# Patient Record
Sex: FEMALE | Race: ASIAN | NOT HISPANIC OR LATINO | URBAN - METROPOLITAN AREA
[De-identification: names, ages, dates, MRNs, and addresses within clinical notes are randomized per-mention and may not be internally consistent; named-entity substitution may affect disease eponyms.]

---

## 2017-05-30 ENCOUNTER — OUTPATIENT (OUTPATIENT)
Dept: OUTPATIENT SERVICES | Facility: HOSPITAL | Age: 32
LOS: 1 days | End: 2017-05-30

## 2017-05-30 DIAGNOSIS — O26.899 OTHER SPECIFIED PREGNANCY RELATED CONDITIONS, UNSPECIFIED TRIMESTER: ICD-10-CM

## 2017-05-30 DIAGNOSIS — Z3A.00 WEEKS OF GESTATION OF PREGNANCY NOT SPECIFIED: ICD-10-CM

## 2017-06-20 PROBLEM — Z00.00 ENCOUNTER FOR PREVENTIVE HEALTH EXAMINATION: Status: ACTIVE | Noted: 2017-06-20

## 2017-06-26 ENCOUNTER — APPOINTMENT (OUTPATIENT)
Dept: MATERNAL FETAL MEDICINE | Facility: CLINIC | Age: 32
End: 2017-06-26

## 2017-06-26 DIAGNOSIS — O24.919 UNSPECIFIED DIABETES MELLITUS IN PREGNANCY, UNSPECIFIED TRIMESTER: ICD-10-CM

## 2017-06-26 RX ORDER — LANCETS 33 GAUGE
EACH MISCELLANEOUS
Qty: 1 | Refills: 6 | Status: ACTIVE | COMMUNITY
Start: 2017-06-26 | End: 1900-01-01

## 2017-06-26 RX ORDER — URINE ACETONE TEST STRIPS
STRIP MISCELLANEOUS
Qty: 1 | Refills: 3 | Status: ACTIVE | COMMUNITY
Start: 2017-06-26 | End: 1900-01-01

## 2017-06-26 RX ORDER — BLOOD SUGAR DIAGNOSTIC
STRIP MISCELLANEOUS
Qty: 1 | Refills: 6 | Status: ACTIVE | COMMUNITY
Start: 2017-06-26 | End: 1900-01-01

## 2017-06-28 ENCOUNTER — APPOINTMENT (OUTPATIENT)
Dept: MATERNAL FETAL MEDICINE | Facility: CLINIC | Age: 32
End: 2017-06-28

## 2017-06-29 RX ORDER — 70%ISOPROPYL ALCOHOL 0.7 ML/ML
70 SWAB TOPICAL
Qty: 1 | Refills: 2 | Status: ACTIVE | COMMUNITY
Start: 2017-06-28 | End: 1900-01-01

## 2017-06-29 RX ORDER — INSULIN DETEMIR 100 [IU]/ML
100 INJECTION, SOLUTION SUBCUTANEOUS
Qty: 1 | Refills: 2 | Status: ACTIVE | COMMUNITY
Start: 2017-06-28 | End: 1900-01-01

## 2017-06-30 ENCOUNTER — MESSAGE (OUTPATIENT)
Age: 32
End: 2017-06-30

## 2017-07-07 ENCOUNTER — APPOINTMENT (OUTPATIENT)
Dept: MATERNAL FETAL MEDICINE | Facility: CLINIC | Age: 32
End: 2017-07-07

## 2017-07-07 DIAGNOSIS — O24.414 GESTATIONAL DIABETES MELLITUS IN PREGNANCY, INSULIN CONTROLLED: ICD-10-CM

## 2017-07-07 RX ORDER — INSULIN ASPART 100 [IU]/ML
100 INJECTION, SOLUTION INTRAVENOUS; SUBCUTANEOUS
Qty: 1 | Refills: 1 | Status: ACTIVE | COMMUNITY
Start: 2017-07-07 | End: 1900-01-01

## 2017-07-19 ENCOUNTER — MESSAGE (OUTPATIENT)
Age: 32
End: 2017-07-19

## 2017-07-21 ENCOUNTER — APPOINTMENT (OUTPATIENT)
Dept: MATERNAL FETAL MEDICINE | Facility: CLINIC | Age: 32
End: 2017-07-21

## 2017-08-04 ENCOUNTER — APPOINTMENT (OUTPATIENT)
Dept: MATERNAL FETAL MEDICINE | Facility: CLINIC | Age: 32
End: 2017-08-04
Payer: COMMERCIAL

## 2017-08-04 PROCEDURE — G0108 DIAB MANAGE TRN  PER INDIV: CPT

## 2017-08-09 ENCOUNTER — MESSAGE (OUTPATIENT)
Age: 32
End: 2017-08-09

## 2017-08-09 ENCOUNTER — APPOINTMENT (OUTPATIENT)
Dept: ANTEPARTUM | Facility: CLINIC | Age: 32
End: 2017-08-09

## 2017-08-09 ENCOUNTER — MEDICATION RENEWAL (OUTPATIENT)
Age: 32
End: 2017-08-09

## 2017-08-09 RX ORDER — PEN NEEDLE, DIABETIC 29 G X1/2"
32G X 4 MM NEEDLE, DISPOSABLE MISCELLANEOUS
Qty: 1 | Refills: 1 | Status: ACTIVE | COMMUNITY
Start: 2017-06-28 | End: 1900-01-01

## 2017-08-15 ENCOUNTER — APPOINTMENT (OUTPATIENT)
Dept: ANTEPARTUM | Facility: CLINIC | Age: 32
End: 2017-08-15
Payer: COMMERCIAL

## 2017-08-15 ENCOUNTER — OUTPATIENT (OUTPATIENT)
Dept: OUTPATIENT SERVICES | Facility: HOSPITAL | Age: 32
LOS: 1 days | End: 2017-08-15

## 2017-08-15 ENCOUNTER — ASOB RESULT (OUTPATIENT)
Age: 32
End: 2017-08-15

## 2017-08-15 PROCEDURE — 76818 FETAL BIOPHYS PROFILE W/NST: CPT | Mod: 26

## 2017-08-15 PROCEDURE — 76805 OB US >/= 14 WKS SNGL FETUS: CPT

## 2017-08-17 ENCOUNTER — APPOINTMENT (OUTPATIENT)
Dept: MATERNAL FETAL MEDICINE | Facility: CLINIC | Age: 32
End: 2017-08-17
Payer: COMMERCIAL

## 2017-08-17 PROCEDURE — G0108 DIAB MANAGE TRN  PER INDIV: CPT

## 2017-08-22 ENCOUNTER — APPOINTMENT (OUTPATIENT)
Dept: ANTEPARTUM | Facility: CLINIC | Age: 32
End: 2017-08-22
Payer: COMMERCIAL

## 2017-08-22 ENCOUNTER — ASOB RESULT (OUTPATIENT)
Age: 32
End: 2017-08-22

## 2017-08-22 ENCOUNTER — APPOINTMENT (OUTPATIENT)
Dept: ANTEPARTUM | Facility: HOSPITAL | Age: 32
End: 2017-08-22

## 2017-08-22 ENCOUNTER — OUTPATIENT (OUTPATIENT)
Dept: OUTPATIENT SERVICES | Facility: HOSPITAL | Age: 32
LOS: 1 days | End: 2017-08-22

## 2017-08-22 DIAGNOSIS — O99.283 ENDOCRINE, NUTRITIONAL AND METABOLIC DISEASES COMPLICATING PREGNANCY, THIRD TRIMESTER: ICD-10-CM

## 2017-08-22 DIAGNOSIS — Z3A.36 36 WEEKS GESTATION OF PREGNANCY: ICD-10-CM

## 2017-08-22 DIAGNOSIS — O99.013 ANEMIA COMPLICATING PREGNANCY, THIRD TRIMESTER: ICD-10-CM

## 2017-08-22 DIAGNOSIS — O24.414 GESTATIONAL DIABETES MELLITUS IN PREGNANCY, INSULIN CONTROLLED: ICD-10-CM

## 2017-08-22 PROCEDURE — 76818 FETAL BIOPHYS PROFILE W/NST: CPT | Mod: 26

## 2017-08-25 DIAGNOSIS — O24.414 GESTATIONAL DIABETES MELLITUS IN PREGNANCY, INSULIN CONTROLLED: ICD-10-CM

## 2017-08-25 DIAGNOSIS — O99.283 ENDOCRINE, NUTRITIONAL AND METABOLIC DISEASES COMPLICATING PREGNANCY, THIRD TRIMESTER: ICD-10-CM

## 2017-08-25 DIAGNOSIS — O99.013 ANEMIA COMPLICATING PREGNANCY, THIRD TRIMESTER: ICD-10-CM

## 2017-08-27 ENCOUNTER — INPATIENT (INPATIENT)
Facility: HOSPITAL | Age: 32
LOS: 2 days | Discharge: HOME | End: 2017-08-30
Attending: SPECIALIST | Admitting: SPECIALIST
Payer: COMMERCIAL

## 2017-08-27 VITALS — WEIGHT: 125.66 LBS | HEIGHT: 62 IN

## 2017-08-27 DIAGNOSIS — O42.10 PREMATURE RUPTURE OF MEMBRANES, ONSET OF LABOR MORE THAN 24 HOURS FOLLOWING RUPTURE, UNSPECIFIED WEEKS OF GESTATION: ICD-10-CM

## 2017-08-27 DIAGNOSIS — O26.899 OTHER SPECIFIED PREGNANCY RELATED CONDITIONS, UNSPECIFIED TRIMESTER: ICD-10-CM

## 2017-08-27 DIAGNOSIS — Z3A.00 WEEKS OF GESTATION OF PREGNANCY NOT SPECIFIED: ICD-10-CM

## 2017-08-27 LAB
BASOPHILS # BLD AUTO: 0.02 K/UL — SIGNIFICANT CHANGE UP (ref 0–0.2)
BASOPHILS NFR BLD AUTO: 0.1 % — SIGNIFICANT CHANGE UP (ref 0–2)
EOSINOPHIL # BLD AUTO: 0.04 K/UL — SIGNIFICANT CHANGE UP (ref 0–0.5)
EOSINOPHIL NFR BLD AUTO: 0.3 % — SIGNIFICANT CHANGE UP (ref 0–6)
HCT VFR BLD CALC: 34 % — LOW (ref 34.5–45)
HGB BLD-MCNC: 11.1 G/DL — LOW (ref 11.5–15.5)
IMM GRANULOCYTES # BLD AUTO: 0.09 # — SIGNIFICANT CHANGE UP
IMM GRANULOCYTES NFR BLD AUTO: 0.7 % — SIGNIFICANT CHANGE UP (ref 0–1.5)
LYMPHOCYTES # BLD AUTO: 16.7 % — SIGNIFICANT CHANGE UP (ref 13–44)
LYMPHOCYTES # BLD AUTO: 2.27 K/UL — SIGNIFICANT CHANGE UP (ref 1–3.3)
MCHC RBC-ENTMCNC: 24 PG — LOW (ref 27–34)
MCHC RBC-ENTMCNC: 32.6 % — SIGNIFICANT CHANGE UP (ref 32–36)
MCV RBC AUTO: 73.4 FL — LOW (ref 80–100)
MONOCYTES # BLD AUTO: 0.74 K/UL — SIGNIFICANT CHANGE UP (ref 0–0.9)
MONOCYTES NFR BLD AUTO: 5.4 % — SIGNIFICANT CHANGE UP (ref 2–14)
NEUTROPHILS # BLD AUTO: 10.47 K/UL — HIGH (ref 1.8–7.4)
NEUTROPHILS NFR BLD AUTO: 76.8 % — SIGNIFICANT CHANGE UP (ref 43–77)
NRBC # FLD: 0 — SIGNIFICANT CHANGE UP
PLATELET # BLD AUTO: 287 K/UL — SIGNIFICANT CHANGE UP (ref 150–400)
PMV BLD: 11.8 FL — SIGNIFICANT CHANGE UP (ref 7–13)
RBC # BLD: 4.63 M/UL — SIGNIFICANT CHANGE UP (ref 3.8–5.2)
RBC # FLD: 14.6 % — HIGH (ref 10.3–14.5)
RH IG SCN BLD-IMP: POSITIVE — SIGNIFICANT CHANGE UP
WBC # BLD: 13.63 K/UL — HIGH (ref 3.8–10.5)
WBC # FLD AUTO: 13.63 K/UL — HIGH (ref 3.8–10.5)

## 2017-08-27 RX ORDER — SODIUM CHLORIDE 9 MG/ML
1000 INJECTION, SOLUTION INTRAVENOUS ONCE
Qty: 0 | Refills: 0 | Status: COMPLETED | OUTPATIENT
Start: 2017-08-27 | End: 2017-08-28

## 2017-08-27 RX ORDER — SODIUM CHLORIDE 9 MG/ML
1000 INJECTION, SOLUTION INTRAVENOUS ONCE
Qty: 0 | Refills: 0 | Status: DISCONTINUED | OUTPATIENT
Start: 2017-08-27 | End: 2017-08-27

## 2017-08-27 RX ORDER — OXYTOCIN 10 UNIT/ML
333.33 VIAL (ML) INJECTION
Qty: 20 | Refills: 0 | Status: DISCONTINUED | OUTPATIENT
Start: 2017-08-27 | End: 2017-08-27

## 2017-08-27 RX ORDER — SODIUM CHLORIDE 9 MG/ML
1000 INJECTION, SOLUTION INTRAVENOUS
Qty: 0 | Refills: 0 | Status: DISCONTINUED | OUTPATIENT
Start: 2017-08-27 | End: 2017-08-27

## 2017-08-27 RX ORDER — CITRIC ACID/SODIUM CITRATE 300-500 MG
15 SOLUTION, ORAL ORAL EVERY 4 HOURS
Qty: 0 | Refills: 0 | Status: DISCONTINUED | OUTPATIENT
Start: 2017-08-27 | End: 2017-08-27

## 2017-08-27 RX ORDER — SODIUM CHLORIDE 9 MG/ML
1000 INJECTION, SOLUTION INTRAVENOUS
Qty: 0 | Refills: 0 | Status: DISCONTINUED | OUTPATIENT
Start: 2017-08-27 | End: 2017-08-28

## 2017-08-27 RX ADMIN — SODIUM CHLORIDE 125 MILLILITER(S): 9 INJECTION, SOLUTION INTRAVENOUS at 19:18

## 2017-08-27 RX ADMIN — SODIUM CHLORIDE 125 MILLILITER(S): 9 INJECTION, SOLUTION INTRAVENOUS at 16:00

## 2017-08-28 ENCOUNTER — TRANSCRIPTION ENCOUNTER (OUTPATIENT)
Age: 32
End: 2017-08-28

## 2017-08-28 DIAGNOSIS — O24.414 GESTATIONAL DIABETES MELLITUS IN PREGNANCY, INSULIN CONTROLLED: ICD-10-CM

## 2017-08-28 LAB
HBA1C BLD-MCNC: 6.1 % — HIGH (ref 4–5.6)
T PALLIDUM AB TITR SER: NEGATIVE — SIGNIFICANT CHANGE UP

## 2017-08-28 PROCEDURE — 99222 1ST HOSP IP/OBS MODERATE 55: CPT

## 2017-08-28 RX ORDER — KETOROLAC TROMETHAMINE 30 MG/ML
30 SYRINGE (ML) INJECTION ONCE
Qty: 0 | Refills: 0 | Status: DISCONTINUED | OUTPATIENT
Start: 2017-08-28 | End: 2017-08-28

## 2017-08-28 RX ORDER — IBUPROFEN 200 MG
600 TABLET ORAL EVERY 6 HOURS
Qty: 0 | Refills: 0 | Status: COMPLETED | OUTPATIENT
Start: 2017-08-28 | End: 2018-07-27

## 2017-08-28 RX ORDER — GLUCAGON INJECTION, SOLUTION 0.5 MG/.1ML
1 INJECTION, SOLUTION SUBCUTANEOUS ONCE
Qty: 0 | Refills: 0 | Status: DISCONTINUED | OUTPATIENT
Start: 2017-08-28 | End: 2017-08-30

## 2017-08-28 RX ORDER — ACETAMINOPHEN 500 MG
975 TABLET ORAL EVERY 6 HOURS
Qty: 0 | Refills: 0 | Status: DISCONTINUED | OUTPATIENT
Start: 2017-08-28 | End: 2017-08-30

## 2017-08-28 RX ORDER — AER TRAVELER 0.5 G/1
1 SOLUTION RECTAL; TOPICAL EVERY 4 HOURS
Qty: 0 | Refills: 0 | Status: DISCONTINUED | OUTPATIENT
Start: 2017-08-28 | End: 2017-08-28

## 2017-08-28 RX ORDER — DIPHENHYDRAMINE HCL 50 MG
25 CAPSULE ORAL EVERY 6 HOURS
Qty: 0 | Refills: 0 | Status: DISCONTINUED | OUTPATIENT
Start: 2017-08-28 | End: 2017-08-30

## 2017-08-28 RX ORDER — SODIUM CHLORIDE 9 MG/ML
3 INJECTION INTRAMUSCULAR; INTRAVENOUS; SUBCUTANEOUS EVERY 8 HOURS
Qty: 0 | Refills: 0 | Status: DISCONTINUED | OUTPATIENT
Start: 2017-08-28 | End: 2017-08-28

## 2017-08-28 RX ORDER — INSULIN LISPRO 100/ML
VIAL (ML) SUBCUTANEOUS
Qty: 0 | Refills: 0 | Status: DISCONTINUED | OUTPATIENT
Start: 2017-08-28 | End: 2017-08-30

## 2017-08-28 RX ORDER — OXYTOCIN 10 UNIT/ML
333.33 VIAL (ML) INJECTION
Qty: 20 | Refills: 0 | Status: COMPLETED | OUTPATIENT
Start: 2017-08-28

## 2017-08-28 RX ORDER — GLYCERIN ADULT
1 SUPPOSITORY, RECTAL RECTAL AT BEDTIME
Qty: 0 | Refills: 0 | Status: DISCONTINUED | OUTPATIENT
Start: 2017-08-28 | End: 2017-08-30

## 2017-08-28 RX ORDER — DEXTROSE 50 % IN WATER 50 %
25 SYRINGE (ML) INTRAVENOUS ONCE
Qty: 0 | Refills: 0 | Status: DISCONTINUED | OUTPATIENT
Start: 2017-08-28 | End: 2017-08-30

## 2017-08-28 RX ORDER — SODIUM CHLORIDE 9 MG/ML
3 INJECTION INTRAMUSCULAR; INTRAVENOUS; SUBCUTANEOUS EVERY 8 HOURS
Qty: 0 | Refills: 0 | Status: DISCONTINUED | OUTPATIENT
Start: 2017-08-28 | End: 2017-08-30

## 2017-08-28 RX ORDER — OXYTOCIN 10 UNIT/ML
2 VIAL (ML) INJECTION
Qty: 30 | Refills: 0 | Status: DISCONTINUED | OUTPATIENT
Start: 2017-08-28 | End: 2017-08-28

## 2017-08-28 RX ORDER — OXYCODONE HYDROCHLORIDE 5 MG/1
5 TABLET ORAL EVERY 4 HOURS
Qty: 0 | Refills: 0 | Status: DISCONTINUED | OUTPATIENT
Start: 2017-08-28 | End: 2017-08-30

## 2017-08-28 RX ORDER — SODIUM CHLORIDE 9 MG/ML
1000 INJECTION, SOLUTION INTRAVENOUS
Qty: 0 | Refills: 0 | Status: DISCONTINUED | OUTPATIENT
Start: 2017-08-28 | End: 2017-08-28

## 2017-08-28 RX ORDER — SODIUM CHLORIDE 9 MG/ML
1000 INJECTION, SOLUTION INTRAVENOUS
Qty: 0 | Refills: 0 | Status: DISCONTINUED | OUTPATIENT
Start: 2017-08-28 | End: 2017-08-30

## 2017-08-28 RX ORDER — DOCUSATE SODIUM 100 MG
100 CAPSULE ORAL
Qty: 0 | Refills: 0 | Status: DISCONTINUED | OUTPATIENT
Start: 2017-08-28 | End: 2017-08-30

## 2017-08-28 RX ORDER — INSULIN LISPRO 100/ML
VIAL (ML) SUBCUTANEOUS AT BEDTIME
Qty: 0 | Refills: 0 | Status: DISCONTINUED | OUTPATIENT
Start: 2017-08-28 | End: 2017-08-30

## 2017-08-28 RX ORDER — HYDROCORTISONE 1 %
1 OINTMENT (GRAM) TOPICAL EVERY 4 HOURS
Qty: 0 | Refills: 0 | Status: DISCONTINUED | OUTPATIENT
Start: 2017-08-28 | End: 2017-08-29

## 2017-08-28 RX ORDER — GENTAMICIN SULFATE 40 MG/ML
80 VIAL (ML) INJECTION ONCE
Qty: 0 | Refills: 0 | Status: COMPLETED | OUTPATIENT
Start: 2017-08-28 | End: 2017-08-28

## 2017-08-28 RX ORDER — OXYTOCIN 10 UNIT/ML
333.33 VIAL (ML) INJECTION
Qty: 20 | Refills: 0 | Status: COMPLETED | OUTPATIENT
Start: 2017-08-28 | End: 2017-08-28

## 2017-08-28 RX ORDER — MAGNESIUM HYDROXIDE 400 MG/1
30 TABLET, CHEWABLE ORAL
Qty: 0 | Refills: 0 | Status: DISCONTINUED | OUTPATIENT
Start: 2017-08-28 | End: 2017-08-30

## 2017-08-28 RX ORDER — ACETAMINOPHEN 500 MG
975 TABLET ORAL EVERY 6 HOURS
Qty: 0 | Refills: 0 | Status: COMPLETED | OUTPATIENT
Start: 2017-08-28 | End: 2018-07-27

## 2017-08-28 RX ORDER — LANOLIN
1 OINTMENT (GRAM) TOPICAL EVERY 6 HOURS
Qty: 0 | Refills: 0 | Status: DISCONTINUED | OUTPATIENT
Start: 2017-08-28 | End: 2017-08-30

## 2017-08-28 RX ORDER — DEXTROSE 50 % IN WATER 50 %
12.5 SYRINGE (ML) INTRAVENOUS ONCE
Qty: 0 | Refills: 0 | Status: DISCONTINUED | OUTPATIENT
Start: 2017-08-28 | End: 2017-08-30

## 2017-08-28 RX ORDER — DEXTROSE 50 % IN WATER 50 %
1 SYRINGE (ML) INTRAVENOUS ONCE
Qty: 0 | Refills: 0 | Status: DISCONTINUED | OUTPATIENT
Start: 2017-08-28 | End: 2017-08-30

## 2017-08-28 RX ORDER — SODIUM CHLORIDE 9 MG/ML
1000 INJECTION, SOLUTION INTRAVENOUS ONCE
Qty: 0 | Refills: 0 | Status: DISCONTINUED | OUTPATIENT
Start: 2017-08-28 | End: 2017-08-28

## 2017-08-28 RX ORDER — PRAMOXINE HYDROCHLORIDE 150 MG/15G
1 AEROSOL, FOAM RECTAL EVERY 4 HOURS
Qty: 0 | Refills: 0 | Status: DISCONTINUED | OUTPATIENT
Start: 2017-08-28 | End: 2017-08-28

## 2017-08-28 RX ORDER — OXYTOCIN 10 UNIT/ML
41.67 VIAL (ML) INJECTION
Qty: 20 | Refills: 0 | Status: DISCONTINUED | OUTPATIENT
Start: 2017-08-28 | End: 2017-08-28

## 2017-08-28 RX ORDER — AMPICILLIN TRIHYDRATE 250 MG
CAPSULE ORAL
Qty: 0 | Refills: 0 | Status: DISCONTINUED | OUTPATIENT
Start: 2017-08-28 | End: 2017-08-28

## 2017-08-28 RX ORDER — AER TRAVELER 0.5 G/1
1 SOLUTION RECTAL; TOPICAL EVERY 4 HOURS
Qty: 0 | Refills: 0 | Status: DISCONTINUED | OUTPATIENT
Start: 2017-08-28 | End: 2017-08-30

## 2017-08-28 RX ORDER — HYDROCORTISONE 1 %
1 OINTMENT (GRAM) TOPICAL EVERY 4 HOURS
Qty: 0 | Refills: 0 | Status: DISCONTINUED | OUTPATIENT
Start: 2017-08-28 | End: 2017-08-28

## 2017-08-28 RX ORDER — TETANUS TOXOID, REDUCED DIPHTHERIA TOXOID AND ACELLULAR PERTUSSIS VACCINE, ADSORBED 5; 2.5; 8; 8; 2.5 [IU]/.5ML; [IU]/.5ML; UG/.5ML; UG/.5ML; UG/.5ML
0.5 SUSPENSION INTRAMUSCULAR ONCE
Qty: 0 | Refills: 0 | Status: COMPLETED | OUTPATIENT
Start: 2017-08-28

## 2017-08-28 RX ORDER — CITRIC ACID/SODIUM CITRATE 300-500 MG
15 SOLUTION, ORAL ORAL EVERY 4 HOURS
Qty: 0 | Refills: 0 | Status: DISCONTINUED | OUTPATIENT
Start: 2017-08-28 | End: 2017-08-28

## 2017-08-28 RX ORDER — AMPICILLIN TRIHYDRATE 250 MG
2 CAPSULE ORAL ONCE
Qty: 0 | Refills: 0 | Status: COMPLETED | OUTPATIENT
Start: 2017-08-28 | End: 2017-08-28

## 2017-08-28 RX ORDER — SIMETHICONE 80 MG/1
80 TABLET, CHEWABLE ORAL EVERY 6 HOURS
Qty: 0 | Refills: 0 | Status: DISCONTINUED | OUTPATIENT
Start: 2017-08-28 | End: 2017-08-30

## 2017-08-28 RX ORDER — OXYCODONE HYDROCHLORIDE 5 MG/1
5 TABLET ORAL
Qty: 0 | Refills: 0 | Status: DISCONTINUED | OUTPATIENT
Start: 2017-08-28 | End: 2017-08-30

## 2017-08-28 RX ORDER — IBUPROFEN 200 MG
600 TABLET ORAL EVERY 6 HOURS
Qty: 0 | Refills: 0 | Status: DISCONTINUED | OUTPATIENT
Start: 2017-08-28 | End: 2017-08-30

## 2017-08-28 RX ORDER — PRAMOXINE HYDROCHLORIDE 150 MG/15G
1 AEROSOL, FOAM RECTAL EVERY 4 HOURS
Qty: 0 | Refills: 0 | Status: DISCONTINUED | OUTPATIENT
Start: 2017-08-28 | End: 2017-08-29

## 2017-08-28 RX ORDER — OXYTOCIN 10 UNIT/ML
41.67 VIAL (ML) INJECTION
Qty: 20 | Refills: 0 | Status: DISCONTINUED | OUTPATIENT
Start: 2017-08-28 | End: 2017-08-29

## 2017-08-28 RX ORDER — DIBUCAINE 1 %
1 OINTMENT (GRAM) RECTAL EVERY 4 HOURS
Qty: 0 | Refills: 0 | Status: DISCONTINUED | OUTPATIENT
Start: 2017-08-28 | End: 2017-08-30

## 2017-08-28 RX ORDER — AMPICILLIN TRIHYDRATE 250 MG
2 CAPSULE ORAL EVERY 6 HOURS
Qty: 0 | Refills: 0 | Status: DISCONTINUED | OUTPATIENT
Start: 2017-08-28 | End: 2017-08-28

## 2017-08-28 RX ORDER — DIBUCAINE 1 %
1 OINTMENT (GRAM) RECTAL EVERY 4 HOURS
Qty: 0 | Refills: 0 | Status: DISCONTINUED | OUTPATIENT
Start: 2017-08-28 | End: 2017-08-28

## 2017-08-28 RX ORDER — ACETAMINOPHEN 500 MG
975 TABLET ORAL ONCE
Qty: 0 | Refills: 0 | Status: COMPLETED | OUTPATIENT
Start: 2017-08-28 | End: 2017-08-28

## 2017-08-28 RX ADMIN — Medication 975 MILLIGRAM(S): at 10:02

## 2017-08-28 RX ADMIN — Medication 600 MILLIGRAM(S): at 22:00

## 2017-08-28 RX ADMIN — Medication 2 MILLIUNIT(S)/MIN: at 07:38

## 2017-08-28 RX ADMIN — Medication 975 MILLIGRAM(S): at 15:14

## 2017-08-28 RX ADMIN — Medication 125 MILLIUNIT(S)/MIN: at 12:50

## 2017-08-28 RX ADMIN — SODIUM CHLORIDE 3 MILLILITER(S): 9 INJECTION INTRAMUSCULAR; INTRAVENOUS; SUBCUTANEOUS at 15:45

## 2017-08-28 RX ADMIN — Medication 600 MILLIGRAM(S): at 22:30

## 2017-08-28 RX ADMIN — Medication: at 19:25

## 2017-08-28 RX ADMIN — Medication 600 MILLIGRAM(S): at 16:05

## 2017-08-28 RX ADMIN — Medication 600 MILLIGRAM(S): at 15:14

## 2017-08-28 RX ADMIN — Medication 100 MILLIGRAM(S): at 10:11

## 2017-08-28 RX ADMIN — Medication 975 MILLIGRAM(S): at 22:30

## 2017-08-28 RX ADMIN — Medication 975 MILLIGRAM(S): at 22:00

## 2017-08-28 RX ADMIN — SODIUM CHLORIDE 2000 MILLILITER(S): 9 INJECTION, SOLUTION INTRAVENOUS at 01:45

## 2017-08-28 RX ADMIN — Medication 975 MILLIGRAM(S): at 16:05

## 2017-08-28 RX ADMIN — Medication 216 GRAM(S): at 10:02

## 2017-08-28 RX ADMIN — Medication 1000 MILLIUNIT(S)/MIN: at 12:50

## 2017-08-28 NOTE — DISCHARGE NOTE OB - PATIENT PORTAL LINK FT
“You can access the [facility] Patient Portal, offered by [facility], by registering with the following website: [email or web address]”

## 2017-08-28 NOTE — DISCHARGE NOTE OB - MEDICATION SUMMARY - MEDICATIONS TO TAKE
I will START or STAY ON the medications listed below when I get home from the hospital:  None I will START or STAY ON the medications listed below when I get home from the hospital:    ibuprofen 600 mg oral tablet  -- 1 tab(s) by mouth every 6 hours, As Needed  -- Indication: For Pain    acetaminophen 325 mg oral tablet  -- 3 tab(s) by mouth every 6 hours, As Needed  -- Indication: For Pain    Prenatal Multivitamins with Folic Acid 1 mg oral tablet  -- 1 tab(s) by mouth once a day  -- Indication: For vitamins

## 2017-08-28 NOTE — DISCHARGE NOTE OB - CARE PROVIDER_API CALL
Cornelius Issa), Obstetrics and Gynecology  [address] [place] [address]  Phone: [phone]  Fax: [phone]

## 2017-08-28 NOTE — CONSULT NOTE ADULT - PROBLEM SELECTOR RECOMMENDATION 9
-patient educated regarding behavioral modifications, risk of developing T2DM in the future. She plans on breastfeeding  -monitor FS data with low correction sliding scale  -if FS remain in low 100's, discharge off all diabetes medications. If sugars are greater than 140's-metformin to titration  -repeat OGTT in 6 weeks post-partum  Plan discussed with team

## 2017-08-28 NOTE — CONSULT NOTE ADULT - SUBJECTIVE AND OBJECTIVE BOX
30 yo woman with GDM diagnosed via OGTT at 25 weeks post partum after delivering healthy baby boy (6 lbs 7 ounces) at 38.5 weeks.  The patient was on levemir 12 units and novolog 8-14 units TID with meals, on average injection about 10-12 with meals.  She has a working meter at home. Her sugars 1 hour post prandial during third trimester was < 140 and pre-prandial values were 80-90s.  Diet: liberal diet    FAMILY HISTORY:  Mother: T2DM on insulin  Father:  from accident    Social History:  Non-smoker, no alcohol, no recreational drugs      Outpatient Medications:  Levemir and Novolog (see above)    MEDICATIONS  (STANDING):  ibuprofen  Tablet 600 milliGRAM(s) Oral every 6 hours  acetaminophen   Tablet. 975 milliGRAM(s) Oral every 6 hours  oxyCODONE    IR 5 milliGRAM(s) Oral every 3 hours  insulin lispro (HumaLOG) corrective regimen sliding scale   SubCutaneous three times a day before meals  insulin lispro (HumaLOG) corrective regimen sliding scale   SubCutaneous at bedtime  dextrose 5%. 1000 milliLiter(s) (50 mL/Hr) IV Continuous <Continuous>  dextrose 50% Injectable 12.5 Gram(s) IV Push once  dextrose 50% Injectable 25 Gram(s) IV Push once  dextrose 50% Injectable 25 Gram(s) IV Push once    MEDICATIONS  (PRN):  oxyCODONE    IR 5 milliGRAM(s) Oral every 4 hours PRN Severe Pain (7 -10)  dextrose Gel 1 Dose(s) Oral once PRN Blood Glucose LESS THAN 70 milliGRAM(s)/deciLiter  glucagon  Injectable 1 milliGRAM(s) IntraMuscular once PRN Glucose <70 milliGRAM(s)/deciLiter      Allergies  No Known Allergies    Review of Systems:  Constitutional: No fever  Neuro: did not feel well with a low FS (not documented); drank juice, felt better  HEENT: No pain  Cardiovascular: No chest pain, palpitations  Respiratory: No SOB, no cough  GI: No nausea, vomiting, abdominal pain  : No dysuria  Skin: no rash  Endocrine: no polyuria, polydipsia  ALL OTHER SYSTEMS REVIEWED AND NEGATIVE      PHYSICAL EXAM:  VITALS: T(C): 36.8 (17 @ 14:08)  T(F): 98.3 (17 @ 14:08), Max: 99.1 (17 @ 11:52)  HR: 87 (17 @ 14:08) (87 - 118)  BP: 93/57 (17 @ 14:08) (91/62 - 107/59)  RR:  (18 - 18)  SpO2:  (99% - 99%)  Wt(kg): --  GENERAL: NAD, well-groomed, well-developed  EYES: No proptosis, no lid lag, anicteric  HEENT:  Atraumatic, Normocephalic, moist mucous membranes  THYROID: Normal size, no palpable nodules  RESPIRATORY: Clear to auscultation bilaterally; No rales, rhonchi, wheezing, or rubs  CARDIOVASCULAR: Regular rate and rhythm; No murmurs; no peripheral edema  SKIN: Dry, intact, No rashes or lesions  MUSCULOSKELETAL: Full range of motion, normal strength                          11.1   13.63 )-----------( 287      ( 27 Aug 2017 15:45 )             34.0

## 2017-08-29 ENCOUNTER — APPOINTMENT (OUTPATIENT)
Dept: ANTEPARTUM | Facility: CLINIC | Age: 32
End: 2017-08-29

## 2017-08-29 ENCOUNTER — APPOINTMENT (OUTPATIENT)
Dept: ANTEPARTUM | Facility: HOSPITAL | Age: 32
End: 2017-08-29

## 2017-08-29 LAB — RBC # BLD FETUS AUTO: 0.2 — SIGNIFICANT CHANGE UP

## 2017-08-29 PROCEDURE — 99232 SBSQ HOSP IP/OBS MODERATE 35: CPT

## 2017-08-29 RX ORDER — HYDROCORTISONE 1 %
1 OINTMENT (GRAM) TOPICAL EVERY 4 HOURS
Qty: 0 | Refills: 0 | Status: DISCONTINUED | OUTPATIENT
Start: 2017-08-29 | End: 2017-08-30

## 2017-08-29 RX ORDER — PRAMOXINE HYDROCHLORIDE 150 MG/15G
1 AEROSOL, FOAM RECTAL EVERY 4 HOURS
Qty: 0 | Refills: 0 | Status: DISCONTINUED | OUTPATIENT
Start: 2017-08-29 | End: 2017-08-30

## 2017-08-29 RX ORDER — TETANUS TOXOID, REDUCED DIPHTHERIA TOXOID AND ACELLULAR PERTUSSIS VACCINE, ADSORBED 5; 2.5; 8; 8; 2.5 [IU]/.5ML; [IU]/.5ML; UG/.5ML; UG/.5ML; UG/.5ML
0.5 SUSPENSION INTRAMUSCULAR ONCE
Qty: 0 | Refills: 0 | Status: COMPLETED | OUTPATIENT
Start: 2017-08-29 | End: 2017-08-29

## 2017-08-29 RX ADMIN — Medication 975 MILLIGRAM(S): at 07:00

## 2017-08-29 RX ADMIN — Medication 600 MILLIGRAM(S): at 13:00

## 2017-08-29 RX ADMIN — Medication 600 MILLIGRAM(S): at 06:30

## 2017-08-29 RX ADMIN — Medication 600 MILLIGRAM(S): at 13:40

## 2017-08-29 RX ADMIN — Medication 975 MILLIGRAM(S): at 06:30

## 2017-08-29 RX ADMIN — Medication 975 MILLIGRAM(S): at 13:40

## 2017-08-29 RX ADMIN — Medication 975 MILLIGRAM(S): at 13:00

## 2017-08-29 RX ADMIN — Medication 600 MILLIGRAM(S): at 07:00

## 2017-08-29 NOTE — PROGRESS NOTE ADULT - PROBLEM SELECTOR PLAN 1
-patient's fingersticks are within target levels. If sugars remain less than 130-140's she can be discharged home without anti-diabetes medications. If > 140 consistently, metformin 500 mg BID  -patient to get repeat OGTT in 6 weeks, outpatient setting  These plans were discussed with OB resident. Endocrine will sign off, recall with quesitons

## 2017-08-29 NOTE — PROGRESS NOTE ADULT - SUBJECTIVE AND OBJECTIVE BOX
Follow up for gestational diabetes. Patient is eating, feeling well. No acute/interval symptoms    MEDICATIONS  (STANDING):  oxyCODONE    IR 5 milliGRAM(s) Oral every 3 hours  sodium chloride 0.9% lock flush 3 milliLiter(s) IV Push every 8 hours  diphtheria/tetanus/pertussis (acellular) Vaccine (ADAcel) 0.5 milliLiter(s) IntraMuscular once  prenatal multivitamin 1 Tablet(s) Oral daily  insulin lispro (HumaLOG) corrective regimen sliding scale   SubCutaneous three times a day before meals  insulin lispro (HumaLOG) corrective regimen sliding scale   SubCutaneous at bedtime  dextrose 5%. 1000 milliLiter(s) (50 mL/Hr) IV Continuous <Continuous>  dextrose 50% Injectable 12.5 Gram(s) IV Push once  dextrose 50% Injectable 25 Gram(s) IV Push once  dextrose 50% Injectable 25 Gram(s) IV Push once  acetaminophen   Tablet. 975 milliGRAM(s) Oral every 6 hours  ibuprofen  Tablet 600 milliGRAM(s) Oral every 6 hours    MEDICATIONS  (PRN):  oxyCODONE    IR 5 milliGRAM(s) Oral every 4 hours PRN Severe Pain (7 -10)  dibucaine 1% Ointment 1 Application(s) Topical every 4 hours PRN Perineal Discomfort  lanolin Ointment 1 Application(s) Topical every 6 hours PRN Sore Nipples  witch hazel Pads 1 Application(s) Topical every 4 hours PRN Perineal Discomfort  simethicone 80 milliGRAM(s) Chew every 6 hours PRN Gas  diphenhydrAMINE   Capsule 25 milliGRAM(s) Oral every 6 hours PRN Itching  glycerin Suppository - Adult 1 Suppository(s) Rectal at bedtime PRN Constipation  magnesium hydroxide Suspension 30 milliLiter(s) Oral two times a day PRN Constipation  docusate sodium 100 milliGRAM(s) Oral two times a day PRN Stool Softening  dextrose Gel 1 Dose(s) Oral once PRN Blood Glucose LESS THAN 70 milliGRAM(s)/deciLiter  glucagon  Injectable 1 milliGRAM(s) IntraMuscular once PRN Glucose <70 milliGRAM(s)/deciLiter  pramoxine 1%/zinc 5% Cream 1 Application(s) Topical every 4 hours PRN perineal discomfort  hydrocortisone 1% Cream 1 Application(s) Topical every 4 hours PRN perineal discomfort      Allergies  No Known Allergies      Review of Systems:  Constitutional: No fever  Eyes: No blurry vision  Neuro: No tremors  HEENT: No pain  Cardiovascular: No chest pain, palpitations  Respiratory: No SOB, no cough  GI: No nausea, vomiting, abdominal pain  ALL OTHER SYSTEMS REVIEWED AND NEGATIVE      PHYSICAL EXAM:  VITALS: T(C): 36.8 (08-29-17 @ 06:56)  T(F): 98.3 (08-29-17 @ 06:56), Max: 98.3 (08-28-17 @ 14:08)  HR: 89 (08-29-17 @ 06:56) (87 - 95)  BP: 100/61 (08-29-17 @ 06:56) (93/57 - 107/64)  RR:  (18 - 19)  SpO2:  (99% - 100%)  Wt(kg): --  GENERAL: NAD, well-groomed, well-developed  EYES: No proptosis, no lid lag, anicteric  HEENT:  Atraumatic, Normocephalic, moist mucous membranes  RESPIRATORY: Unlabored, no use of accessory m uscles  CARDIOVASCULAR: Regular rate and rhythm; No murmurs; no peripheral edema  SKIN: Dry, intact, No rashes or lesions  MUSCULOSKELETAL: Full range of motion, normal strength      CAPILLARY BLOOD GLUCOSE  109 (08-29 @ 13:00)  98 (08-29 @ 06:56)  141 (08-28 @ 22:30)  166 (08-28 @ 18:40)    Hemoglobin A1C, Whole Blood: 6.1 % <H> [4.0 - 5.6] (08-28-17 @ 16:13) Follow up for gestational diabetes. Patient is eating, feeling well. No acute/interval symptoms    MEDICATIONS  (STANDING):  oxyCODONE    IR 5 milliGRAM(s) Oral every 3 hours  sodium chloride 0.9% lock flush 3 milliLiter(s) IV Push every 8 hours  diphtheria/tetanus/pertussis (acellular) Vaccine (ADAcel) 0.5 milliLiter(s) IntraMuscular once  prenatal multivitamin 1 Tablet(s) Oral daily  insulin lispro (HumaLOG) corrective regimen sliding scale   SubCutaneous three times a day before meals  insulin lispro (HumaLOG) corrective regimen sliding scale   SubCutaneous at bedtime  dextrose 5%. 1000 milliLiter(s) (50 mL/Hr) IV Continuous <Continuous>  dextrose 50% Injectable 12.5 Gram(s) IV Push once  dextrose 50% Injectable 25 Gram(s) IV Push once  dextrose 50% Injectable 25 Gram(s) IV Push once  acetaminophen   Tablet. 975 milliGRAM(s) Oral every 6 hours  ibuprofen  Tablet 600 milliGRAM(s) Oral every 6 hours    MEDICATIONS  (PRN):  oxyCODONE    IR 5 milliGRAM(s) Oral every 4 hours PRN Severe Pain (7 -10)  dibucaine 1% Ointment 1 Application(s) Topical every 4 hours PRN Perineal Discomfort  lanolin Ointment 1 Application(s) Topical every 6 hours PRN Sore Nipples  witch hazel Pads 1 Application(s) Topical every 4 hours PRN Perineal Discomfort  simethicone 80 milliGRAM(s) Chew every 6 hours PRN Gas  diphenhydrAMINE   Capsule 25 milliGRAM(s) Oral every 6 hours PRN Itching  glycerin Suppository - Adult 1 Suppository(s) Rectal at bedtime PRN Constipation  magnesium hydroxide Suspension 30 milliLiter(s) Oral two times a day PRN Constipation  docusate sodium 100 milliGRAM(s) Oral two times a day PRN Stool Softening  dextrose Gel 1 Dose(s) Oral once PRN Blood Glucose LESS THAN 70 milliGRAM(s)/deciLiter  glucagon  Injectable 1 milliGRAM(s) IntraMuscular once PRN Glucose <70 milliGRAM(s)/deciLiter  pramoxine 1%/zinc 5% Cream 1 Application(s) Topical every 4 hours PRN perineal discomfort  hydrocortisone 1% Cream 1 Application(s) Topical every 4 hours PRN perineal discomfort      Allergies  No Known Allergies    Review of Systems:  Constitutional: No fever  Eyes: No blurry vision  Neuro: No tremors  HEENT: No pain  Cardiovascular: No chest pain, palpitations  Respiratory: No SOB, no cough  GI: No nausea, vomiting, abdominal pain  ALL OTHER SYSTEMS REVIEWED AND NEGATIVE      PHYSICAL EXAM:  VITALS: T(C): 36.8 (08-29-17 @ 06:56)  T(F): 98.3 (08-29-17 @ 06:56), Max: 98.3 (08-28-17 @ 14:08)  HR: 89 (08-29-17 @ 06:56) (87 - 95)  BP: 100/61 (08-29-17 @ 06:56) (93/57 - 107/64)  RR:  (18 - 19)  SpO2:  (99% - 100%)  Wt(kg): --  GENERAL: NAD, well-groomed, well-developed  EYES: No proptosis, no lid lag, anicteric  HEENT:  Atraumatic, Normocephalic, moist mucous membranes  RESPIRATORY: Unlabored, no use of accessory m uscles  CARDIOVASCULAR: Regular rate and rhythm; No murmurs; no peripheral edema  SKIN: Dry, intact, No rashes or lesions  MUSCULOSKELETAL: Full range of motion, normal strength      CAPILLARY BLOOD GLUCOSE  109 (08-29 @ 13:00)  98 (08-29 @ 06:56)  141 (08-28 @ 22:30)  166 (08-28 @ 18:40)    Hemoglobin A1C, Whole Blood: 6.1 % <H> [4.0 - 5.6] (08-28-17 @ 16:13)

## 2017-08-29 NOTE — PROGRESS NOTE ADULT - SUBJECTIVE AND OBJECTIVE BOX
S: Patient doing well. Minimal lochia. Pain controlled. breastfeeding    O: Vital Signs Last 24 Hrs  T(C): 36.8 (29 Aug 2017 06:56), Max: 36.8 (28 Aug 2017 14:08)  T(F): 98.3 (29 Aug 2017 06:56), Max: 98.3 (28 Aug 2017 14:08)  HR: 89 (29 Aug 2017 06:56) (87 - 95)  BP: 100/61 (29 Aug 2017 06:56) (93/57 - 107/64)  BP(mean): --  RR: 18 (29 Aug 2017 06:56) (18 - 19)  SpO2: 100% (29 Aug 2017 06:56) (99% - 100%)    Gen: NAD  Abd: soft, NT, ND, fundus firm below umbilicus  Lochia: moderate  Ext: no tenderness    Labs:                        11.1   13.63 )-----------( 287      ( 27 Aug 2017 15:45 )             34.0       ABO Interpretation: O ( @ 15:23)    Rh Interpretation: Positive ( @ 15:23)        A: 31y PPD# 1s/p  doing well.     Plan: Continue routine postpartum care.

## 2017-08-30 VITALS
SYSTOLIC BLOOD PRESSURE: 95 MMHG | TEMPERATURE: 98 F | DIASTOLIC BLOOD PRESSURE: 60 MMHG | OXYGEN SATURATION: 100 % | RESPIRATION RATE: 17 BRPM | HEART RATE: 84 BPM

## 2017-08-30 RX ORDER — ACETAMINOPHEN 500 MG
3 TABLET ORAL
Qty: 0 | Refills: 0 | DISCHARGE
Start: 2017-08-30

## 2017-08-30 RX ORDER — IBUPROFEN 200 MG
1 TABLET ORAL
Qty: 0 | Refills: 0 | DISCHARGE
Start: 2017-08-30

## 2017-08-30 RX ADMIN — Medication 975 MILLIGRAM(S): at 01:14

## 2017-08-30 RX ADMIN — Medication 600 MILLIGRAM(S): at 09:22

## 2017-08-30 RX ADMIN — Medication 100 MILLIGRAM(S): at 08:24

## 2017-08-30 RX ADMIN — Medication 600 MILLIGRAM(S): at 01:14

## 2017-08-30 RX ADMIN — TETANUS TOXOID, REDUCED DIPHTHERIA TOXOID AND ACELLULAR PERTUSSIS VACCINE, ADSORBED 0.5 MILLILITER(S): 5; 2.5; 8; 8; 2.5 SUSPENSION INTRAMUSCULAR at 00:25

## 2017-08-30 RX ADMIN — Medication 600 MILLIGRAM(S): at 08:22

## 2017-08-30 RX ADMIN — Medication 600 MILLIGRAM(S): at 00:25

## 2017-08-30 RX ADMIN — Medication 975 MILLIGRAM(S): at 09:22

## 2017-08-30 RX ADMIN — Medication 975 MILLIGRAM(S): at 08:22

## 2017-08-30 RX ADMIN — Medication 975 MILLIGRAM(S): at 00:25

## 2017-08-30 NOTE — PROGRESS NOTE ADULT - SUBJECTIVE AND OBJECTIVE BOX
Patient assessed at 1015.  Subjective  Pain: Patient reports soreness of perineum that is relieved by pain management protocol, topicals and witch hazel pads.  Complaints: Reports soreness of perineum  Milestones: Alert and orientedx3. Out of bed ambulating. Voiding/Due to void. Tolerating a regular diet.  Infant feeding: Breastfeeding. Infant in the NICU and patient states infant latches onto breasts                             Feeding related issues or concerns: None.     Objective   Vital Signs:  Vital Signs Last 24 Hrs  T(C): 36.7 (30 Aug 2017 06:59), Max: 36.7 (30 Aug 2017 06:59)  T(F): 98.1 (30 Aug 2017 06:59), Max: 98.1 (30 Aug 2017 06:59)  HR: 84 (30 Aug 2017 06:59) (84 - 89)  BP: 95/60 (30 Aug 2017 06:59) (95/60 - 102/66)  BP(mean): --  RR: 17 (30 Aug 2017 06:59) (17 - 18)  SpO2: 100% (30 Aug 2017 06:59) (100% - 100%)    Fingersticks monitoring due to gestational diabetes insulin controlled.   CAPILLARY BLOOD GLUCOSE  92 (30 Aug 2017 06:59)  114 (30 Aug 2017 00:30)  108 (29 Aug 2017 20:44)  109 (29 Aug 2017 13:00)    Labs:  17 @ 1545 WBC 13.63, RBC 4.63, Hgb 11.1, Hct 34, Plt 287  Blood Type: Opositive   Rubella: Immune  RPR: nonreactive    Assessment  32y/o . Day #2 postpartum .  Past medical history significant for gestational diabetes insulin controlled. Fingersticks before meal and at bedtime WNL postpartum. Patient seen endocrinology since delivery. As per recommendation based on postpartum fingersticks patient to be discharge to home and follow of glucose testing in 6weeks. Patient informed and verbalized an understanding.    Current Issues: s/p intrapartum temperature of 38.5 (17 @1000). Patient afebrile since then.  Breasts: mares, nontender  Nipples: intact  Abdomen: Soft, nontender, nondistended. Fundus firm. Positive bowel sounds  Vagina: Lochia light rubra  Perineum:  median episiotomy WNL. No edema noted to perineum  Laceration/episiotomy site: median episiotomy  Lower extremities: No edema noted bilaterally of lower extremities. Nontender calves.  Negative Gillian's sign. Positive pedal pulses.  Other relevant physical exam findings: None      Plan  Plan: Increase ambulation, analgesia PRN and pain medication protocol standing oxycodone, ibuprofen and acetaminophen.  Diet: Continue regular diet  Labs: None    Continue routine postpartum care. Patient to be discharge to home today. Discussed discharge planning. Patient assessed at 1015.  Subjective  Pain: Patient reports soreness of perineum that is relieved by pain management protocol, topicals and witch hazel pads.  Complaints: Reports soreness of perineum  Milestones: Alert and orientedx3. Out of bed ambulating. Positive bowel movement. Voiding. Tolerating a regular diet.  Infant feeding: Breastfeeding. Infant in the NICU and patient states infant latches onto breasts                             Feeding related issues or concerns: None.     Objective   Vital Signs:  Vital Signs Last 24 Hrs  T(C): 36.7 (30 Aug 2017 06:59), Max: 36.7 (30 Aug 2017 06:59)  T(F): 98.1 (30 Aug 2017 06:59), Max: 98.1 (30 Aug 2017 06:59)  HR: 84 (30 Aug 2017 06:59) (84 - 89)  BP: 95/60 (30 Aug 2017 06:59) (95/60 - 102/66)  BP(mean): --  RR: 17 (30 Aug 2017 06:59) (17 - 18)  SpO2: 100% (30 Aug 2017 06:59) (100% - 100%)    Fingersticks monitoring due to gestational diabetes insulin controlled.   CAPILLARY BLOOD GLUCOSE  92 (30 Aug 2017 06:59)  114 (30 Aug 2017 00:30)  108 (29 Aug 2017 20:44)  109 (29 Aug 2017 13:00)    Labs:  17 @ 1545 WBC 13.63, RBC 4.63, Hgb 11.1, Hct 34, Plt 287  Blood Type: Opositive   Rubella: Immune  RPR: nonreactive    Assessment  30y/o . Day #2 postpartum .  Past medical history significant for gestational diabetes insulin controlled. Fingersticks before meal and at bedtime WNL postpartum. Patient seen endocrinology since delivery. As per recommendation based on postpartum fingersticks patient to be discharge to home and follow of glucose testing in 6weeks. Patient informed and verbalized an understanding.    Current Issues: s/p intrapartum temperature of 38.5 (17 @1000). Patient afebrile since then.  Breasts: mares, nontender  Nipples: intact  Abdomen: Soft, nontender, nondistended. Fundus firm. Positive bowel sounds  Vagina: Lochia light rubra  Perineum:  median episiotomy WNL. No edema noted to perineum  Laceration/episiotomy site: median episiotomy  Lower extremities: No edema noted bilaterally of lower extremities. Nontender calves.  Negative Gillian's sign. Positive pedal pulses.  Other relevant physical exam findings: None      Plan  Plan: Increase ambulation, analgesia PRN and pain medication protocol standing oxycodone, ibuprofen and acetaminophen.  Diet: Continue regular diet  Labs: None    Continue routine postpartum care. Patient to be discharge to home today. Discussed discharge planning.

## 2017-10-20 ENCOUNTER — OUTPATIENT (OUTPATIENT)
Dept: OUTPATIENT SERVICES | Facility: HOSPITAL | Age: 32
LOS: 1 days | Discharge: HOME | End: 2017-10-20

## 2017-10-20 LAB
ALBUMIN SERPL ELPH-MCNC: 4.9 G/DL — SIGNIFICANT CHANGE UP (ref 3.3–5)
ALP SERPL-CCNC: 66 U/L — SIGNIFICANT CHANGE UP (ref 40–120)
ALT FLD-CCNC: 14 U/L — SIGNIFICANT CHANGE UP (ref 4–33)
AST SERPL-CCNC: 21 U/L — SIGNIFICANT CHANGE UP (ref 4–32)
BASOPHILS # BLD AUTO: 0.03 K/UL — SIGNIFICANT CHANGE UP (ref 0–0.2)
BASOPHILS NFR BLD AUTO: 0.4 % — SIGNIFICANT CHANGE UP (ref 0–2)
BILIRUB SERPL-MCNC: 0.3 MG/DL — SIGNIFICANT CHANGE UP (ref 0.2–1.2)
BUN SERPL-MCNC: 13 MG/DL — SIGNIFICANT CHANGE UP (ref 7–23)
CALCIUM SERPL-MCNC: 9.1 MG/DL — SIGNIFICANT CHANGE UP (ref 8.4–10.5)
CHLORIDE SERPL-SCNC: 99 MMOL/L — SIGNIFICANT CHANGE UP (ref 98–107)
CO2 SERPL-SCNC: 27 MMOL/L — SIGNIFICANT CHANGE UP (ref 22–31)
CREAT SERPL-MCNC: 0.55 MG/DL — SIGNIFICANT CHANGE UP (ref 0.5–1.3)
EOSINOPHIL # BLD AUTO: 0.12 K/UL — SIGNIFICANT CHANGE UP (ref 0–0.5)
EOSINOPHIL NFR BLD AUTO: 1.5 % — SIGNIFICANT CHANGE UP (ref 0–6)
GLUCOSE SERPL-MCNC: 65 MG/DL — LOW (ref 70–99)
HCT VFR BLD CALC: 32.6 % — LOW (ref 34.5–45)
HGB BLD-MCNC: 10 G/DL — LOW (ref 11.5–15.5)
IMM GRANULOCYTES # BLD AUTO: 0.02 # — SIGNIFICANT CHANGE UP
IMM GRANULOCYTES NFR BLD AUTO: 0.3 % — SIGNIFICANT CHANGE UP (ref 0–1.5)
LYMPHOCYTES # BLD AUTO: 2.45 K/UL — SIGNIFICANT CHANGE UP (ref 1–3.3)
LYMPHOCYTES # BLD AUTO: 30.9 % — SIGNIFICANT CHANGE UP (ref 13–44)
MCHC RBC-ENTMCNC: 22.7 PG — LOW (ref 27–34)
MCHC RBC-ENTMCNC: 30.7 % — LOW (ref 32–36)
MCV RBC AUTO: 73.9 FL — LOW (ref 80–100)
MONOCYTES # BLD AUTO: 0.52 K/UL — SIGNIFICANT CHANGE UP (ref 0–0.9)
MONOCYTES NFR BLD AUTO: 6.6 % — SIGNIFICANT CHANGE UP (ref 2–14)
NEUTROPHILS # BLD AUTO: 4.79 K/UL — SIGNIFICANT CHANGE UP (ref 1.8–7.4)
NEUTROPHILS NFR BLD AUTO: 60.3 % — SIGNIFICANT CHANGE UP (ref 43–77)
NRBC # FLD: 0 — SIGNIFICANT CHANGE UP
PLATELET # BLD AUTO: 390 K/UL — SIGNIFICANT CHANGE UP (ref 150–400)
PMV BLD: 11.1 FL — SIGNIFICANT CHANGE UP (ref 7–13)
POTASSIUM SERPL-MCNC: 3.9 MMOL/L — SIGNIFICANT CHANGE UP (ref 3.5–5.3)
POTASSIUM SERPL-SCNC: 3.9 MMOL/L — SIGNIFICANT CHANGE UP (ref 3.5–5.3)
PROT SERPL-MCNC: 7.7 G/DL — SIGNIFICANT CHANGE UP (ref 6–8.3)
RBC # BLD: 4.41 M/UL — SIGNIFICANT CHANGE UP (ref 3.8–5.2)
RBC # FLD: 13.8 % — SIGNIFICANT CHANGE UP (ref 10.3–14.5)
SODIUM SERPL-SCNC: 141 MMOL/L — SIGNIFICANT CHANGE UP (ref 135–145)
TSH SERPL-MCNC: 1.6 UIU/ML — SIGNIFICANT CHANGE UP (ref 0.27–4.2)
WBC # BLD: 7.93 K/UL — SIGNIFICANT CHANGE UP (ref 3.8–10.5)
WBC # FLD AUTO: 7.93 K/UL — SIGNIFICANT CHANGE UP (ref 3.8–10.5)

## 2017-10-23 DIAGNOSIS — F32.9 MAJOR DEPRESSIVE DISORDER, SINGLE EPISODE, UNSPECIFIED: ICD-10-CM

## 2017-11-09 ENCOUNTER — APPOINTMENT (OUTPATIENT)
Dept: MATERNAL FETAL MEDICINE | Facility: CLINIC | Age: 32
End: 2017-11-09
Payer: COMMERCIAL

## 2017-11-09 PROCEDURE — G0109 DIAB MANAGE TRN IND/GROUP: CPT

## 2017-11-14 ENCOUNTER — OTHER (OUTPATIENT)
Age: 32
End: 2017-11-14

## 2017-11-15 LAB
GLUCOSE 2H P 100 G GLC PO SERPL-MCNC: 148 MG/DL
GLUCOSE BS SERPL-MCNC: 84 MG/DL

## 2019-07-16 ENCOUNTER — RESULT REVIEW (OUTPATIENT)
Age: 34
End: 2019-07-16

## 2019-08-25 ENCOUNTER — TRANSCRIPTION ENCOUNTER (OUTPATIENT)
Age: 34
End: 2019-08-25

## 2020-01-06 ENCOUNTER — APPOINTMENT (OUTPATIENT)
Dept: MATERNAL FETAL MEDICINE | Facility: CLINIC | Age: 35
End: 2020-01-06
Payer: COMMERCIAL

## 2020-01-06 ENCOUNTER — APPOINTMENT (OUTPATIENT)
Dept: MATERNAL FETAL MEDICINE | Facility: CLINIC | Age: 35
End: 2020-01-06

## 2020-01-06 ENCOUNTER — ASOB RESULT (OUTPATIENT)
Age: 35
End: 2020-01-06

## 2020-01-06 PROCEDURE — G0108 DIAB MANAGE TRN  PER INDIV: CPT

## 2020-01-06 RX ORDER — BLOOD-GLUCOSE METER
KIT MISCELLANEOUS
Qty: 2 | Refills: 5 | Status: COMPLETED | COMMUNITY
Start: 2020-01-06 | End: 2021-01-06

## 2020-01-06 RX ORDER — INSULIN DETEMIR 100 [IU]/ML
100 INJECTION, SOLUTION SUBCUTANEOUS
Qty: 1 | Refills: 2 | Status: COMPLETED | COMMUNITY
Start: 2020-01-06 | End: 2021-01-06

## 2020-01-06 RX ORDER — ISOPROPYL ALCOHOL 0.7 ML/ML
SWAB TOPICAL
Qty: 2 | Refills: 2 | Status: COMPLETED | COMMUNITY
Start: 2020-01-06 | End: 2021-01-06

## 2020-01-06 RX ORDER — URINE ACETONE TEST STRIPS
STRIP MISCELLANEOUS
Qty: 1 | Refills: 0 | Status: COMPLETED | COMMUNITY
Start: 2020-01-06 | End: 2021-01-06

## 2020-01-06 RX ORDER — INSULIN ASPART 100 [IU]/ML
100 INJECTION, SOLUTION INTRAVENOUS; SUBCUTANEOUS
Qty: 1 | Refills: 2 | Status: COMPLETED | COMMUNITY
Start: 2020-01-06 | End: 2021-01-06

## 2020-01-06 RX ORDER — LANCETS 33 GAUGE
EACH MISCELLANEOUS
Qty: 2 | Refills: 6 | Status: COMPLETED | COMMUNITY
Start: 2020-01-06 | End: 2021-01-06

## 2020-01-06 RX ORDER — BLOOD-GLUCOSE METER
W/DEVICE KIT MISCELLANEOUS
Qty: 1 | Refills: 0 | Status: ACTIVE | COMMUNITY
Start: 2020-01-06 | End: 1900-01-01

## 2020-01-06 RX ORDER — PEN NEEDLE, DIABETIC 29 G X1/2"
32G X 4 MM NEEDLE, DISPOSABLE MISCELLANEOUS
Qty: 2 | Refills: 2 | Status: COMPLETED | COMMUNITY
Start: 2020-01-06 | End: 2021-01-06

## 2020-01-23 ENCOUNTER — ASOB RESULT (OUTPATIENT)
Age: 35
End: 2020-01-23

## 2020-01-23 ENCOUNTER — APPOINTMENT (OUTPATIENT)
Dept: MATERNAL FETAL MEDICINE | Facility: CLINIC | Age: 35
End: 2020-01-23
Payer: COMMERCIAL

## 2020-01-23 PROCEDURE — G0108 DIAB MANAGE TRN  PER INDIV: CPT

## 2020-01-23 RX ORDER — INSULIN LISPRO 100 [IU]/ML
100 INJECTION, SOLUTION INTRAVENOUS; SUBCUTANEOUS
Qty: 2 | Refills: 1 | Status: COMPLETED | COMMUNITY
Start: 2020-01-06 | End: 2021-01-23

## 2020-02-06 ENCOUNTER — ASOB RESULT (OUTPATIENT)
Age: 35
End: 2020-02-06

## 2020-02-06 ENCOUNTER — APPOINTMENT (OUTPATIENT)
Dept: MATERNAL FETAL MEDICINE | Facility: CLINIC | Age: 35
End: 2020-02-06
Payer: COMMERCIAL

## 2020-02-06 PROCEDURE — G0108 DIAB MANAGE TRN  PER INDIV: CPT

## 2020-02-20 RX ORDER — INSULIN LISPRO 100 [IU]/ML
100 INJECTION, SOLUTION INTRAVENOUS; SUBCUTANEOUS
Qty: 6 | Refills: 0 | Status: ACTIVE | COMMUNITY
Start: 2020-02-20 | End: 1900-01-01

## 2020-02-27 ENCOUNTER — ASOB RESULT (OUTPATIENT)
Age: 35
End: 2020-02-27

## 2020-02-27 ENCOUNTER — APPOINTMENT (OUTPATIENT)
Dept: MATERNAL FETAL MEDICINE | Facility: CLINIC | Age: 35
End: 2020-02-27
Payer: COMMERCIAL

## 2020-02-27 PROCEDURE — G0108 DIAB MANAGE TRN  PER INDIV: CPT

## 2020-03-16 ENCOUNTER — APPOINTMENT (OUTPATIENT)
Dept: MATERNAL FETAL MEDICINE | Facility: CLINIC | Age: 35
End: 2020-03-16

## 2020-03-20 ENCOUNTER — APPOINTMENT (OUTPATIENT)
Dept: MATERNAL FETAL MEDICINE | Facility: CLINIC | Age: 35
End: 2020-03-20

## 2020-04-06 ENCOUNTER — OUTPATIENT (OUTPATIENT)
Dept: OUTPATIENT SERVICES | Facility: HOSPITAL | Age: 35
LOS: 1 days | Discharge: HOME | End: 2020-04-06
Payer: COMMERCIAL

## 2020-04-06 VITALS
SYSTOLIC BLOOD PRESSURE: 104 MMHG | DIASTOLIC BLOOD PRESSURE: 69 MMHG | RESPIRATION RATE: 18 BRPM | HEART RATE: 92 BPM | TEMPERATURE: 98 F

## 2020-04-06 VITALS — DIASTOLIC BLOOD PRESSURE: 82 MMHG | SYSTOLIC BLOOD PRESSURE: 134 MMHG | HEART RATE: 84 BPM

## 2020-04-06 DIAGNOSIS — O26.899 OTHER SPECIFIED PREGNANCY RELATED CONDITIONS, UNSPECIFIED TRIMESTER: ICD-10-CM

## 2020-04-06 DIAGNOSIS — Z3A.00 WEEKS OF GESTATION OF PREGNANCY NOT SPECIFIED: ICD-10-CM

## 2020-04-06 DIAGNOSIS — Z90.89 ACQUIRED ABSENCE OF OTHER ORGANS: Chronic | ICD-10-CM

## 2020-04-06 PROCEDURE — 99214 OFFICE O/P EST MOD 30 MIN: CPT

## 2020-04-06 NOTE — OB PROVIDER TRIAGE NOTE - NSOBPROVIDERNOTE_OBGYN_ALL_OB_FT
33yo  female  @ 38.5 wks SLIUP GDMA2 here complaining of ctx's  -VE-2.5/70/-2  -GBS neg 35yo  female  @ 38.5 wks SLIUP GDMA2 here complaining of ctx's  -VE-2.5/70/-2  -GBS neg    addendum;   patient received at 1930; with reports of increased contraction pain  patient tracing reviewed; irregular contractions noted on tocometer     patient re-examined; vaginal exam noted to be unchanged at this time  biophysical profile 8/8  Cephalic; anterior placenta  amniotic fluid index: 10.5cm    patient cleared for discharge home ambulatory at this time  evidence of latent labor  patient educated on labor precautions  fetal kick counts emphasized  patient discharged home ambulatory and stable    D/w Dr. Guerra

## 2020-04-06 NOTE — OB PROVIDER TRIAGE NOTE - NSHPPHYSICALEXAM_GEN_ALL_CORE
Gen-A&O x 3; appears mildly uncomfortable with ctx's  Vitals: BP-134/82; P-84; T-37.0    Pulm-CTA B/L; no wheezes/rales/ronchi  Cor-Clear S1S2; no murmurs appreciated  Abd exam-soft and nontender; gravid    VE-2.5/70/-2    NST cat I with 135 baseline and accels and mod variability; ctx's Q3-4    GBS neg

## 2020-04-06 NOTE — OB PROVIDER TRIAGE NOTE - ADDITIONAL INSTRUCTIONS
evidence of latent labor  patient educated on labor precautions  fetal kick counts emphasized  patient discharged home ambulatory and stable

## 2020-04-06 NOTE — OB PROVIDER TRIAGE NOTE - NS_OBGYNHISTORY_OBGYN_ALL_OB_FT
2017-7#6  FT GDMA2; hypothyroidism on levothyroxin x 4 months; Post Partum depression saw therapist x 6 months

## 2020-04-06 NOTE — OB PROVIDER TRIAGE NOTE - HISTORY OF PRESENT ILLNESS
35yo  female  @ 38.5 wks SLIUP GDMA2 here co irregular ctx's since early this morning. Pt is intact with GFM. Pt is scheduled to be induced on  for GDMA2.    Pmhx-thalassemia (FOB tested neg)  [id number]  Meds-PNV; Humolog 25-30u SQ TID with meals; Levemir 15U QHS  NKDA  Gyn-denies  Past ob-2017- FT 7#6 GDMA2 with hypothyroidism; Hx PPD saw therapist x 6 months  Soc-denies Etoh/drug use

## 2020-04-08 ENCOUNTER — INPATIENT (INPATIENT)
Facility: HOSPITAL | Age: 35
LOS: 1 days | Discharge: HOME | End: 2020-04-10
Attending: SPECIALIST | Admitting: SPECIALIST

## 2020-04-08 VITALS
DIASTOLIC BLOOD PRESSURE: 71 MMHG | RESPIRATION RATE: 17 BRPM | HEART RATE: 90 BPM | SYSTOLIC BLOOD PRESSURE: 104 MMHG | TEMPERATURE: 98 F

## 2020-04-08 DIAGNOSIS — O26.899 OTHER SPECIFIED PREGNANCY RELATED CONDITIONS, UNSPECIFIED TRIMESTER: ICD-10-CM

## 2020-04-08 DIAGNOSIS — Z90.89 ACQUIRED ABSENCE OF OTHER ORGANS: Chronic | ICD-10-CM

## 2020-04-08 DIAGNOSIS — Z3A.00 WEEKS OF GESTATION OF PREGNANCY NOT SPECIFIED: ICD-10-CM

## 2020-04-08 RX ORDER — SODIUM CHLORIDE 9 MG/ML
1000 INJECTION, SOLUTION INTRAVENOUS
Refills: 0 | Status: DISCONTINUED | OUTPATIENT
Start: 2020-04-08 | End: 2020-04-09

## 2020-04-08 RX ORDER — CITRIC ACID/SODIUM CITRATE 300-500 MG
15 SOLUTION, ORAL ORAL EVERY 6 HOURS
Refills: 0 | Status: DISCONTINUED | OUTPATIENT
Start: 2020-04-08 | End: 2020-04-09

## 2020-04-08 RX ORDER — OXYTOCIN 10 UNIT/ML
333.33 VIAL (ML) INJECTION
Qty: 20 | Refills: 0 | Status: DISCONTINUED | OUTPATIENT
Start: 2020-04-08 | End: 2020-04-09

## 2020-04-08 NOTE — OB PROVIDER TRIAGE NOTE - NSOBPROVIDERNOTE_OBGYN_ALL_OB_FT
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 Wean O2. Was on 4L at home  - Cont rocephin/azithromycin- plan to complete 5 day course  -Pred 60 mg x 1 given 5/30/19  -procalcitonin negative  -Lactate 2.4 on admit, trended down to normal   -RVP negative  - nebs q4h, guaifenesin 600mg BID  - continue selexipag 1400mcg BID (patient brought home med). Restarted macitentan 5/31/19, as this is likely infection and not SE of med     Discussed with Dr. Lino. Admit to labor and delivery.

## 2020-04-08 NOTE — OB PROVIDER TRIAGE NOTE - NSHPPHYSICALEXAM_GEN_ALL_CORE
VS: 104/71  Hr: 90  T: 36.9  Abdomen: Soft, non-tender on palpation  TAUS: Anterior placenta, Cephalic presentation, Bpp:8/8, JOSE: 13.44cm  SVE:4/60/-2  NST:   [place] VS: 104/71  Hr: 90  T: 36.9  Abdomen: Soft, non-tender on palpation  TAUS: Anterior placenta, Cephalic presentation, Bpp:8/8, JOSE: 13.44cm  SVE:4/60/-2  NST:  with moderate variability, 15x15 accelerations, no decelerations   Hume: Irregular contractions

## 2020-04-08 NOTE — OB RN TRIAGE NOTE - PMH
Hypothyroidism  last pregnancy  Vaginal delivery  x1 2017 Hypothyroidism  last pregnancy no medications at present or post partum as per the pateint  Vaginal delivery  8/28/ 2017 FT M 7#6

## 2020-04-08 NOTE — OB PROVIDER TRIAGE NOTE - HISTORY OF PRESENT ILLNESS
Patient of Dr. Issa 35 y/o  @39 weeks presents to triage with complaints of decreased fetal movement since 0800pm. Pt denies complains of irregular contractions. Pt denies leaking of fluid and vaginal bleeding. Pt is scheduled for induction of labor of midnight. Pt denies fever, chills, nausea and vomiting  Current Ap course significant for: GDMA1  Medical H/x: Hypothyroidism  (no meds)  -Alpha Thalassemia   -Anemia  Surgical H/x: Tonsillectomy   Ob/Gyn H/x: 2017//Full-Term/7Lbs 6oz  Psych H/x:   Meds: Levemir 15 units/nightly  Humalog (25-30 Units based on Carbs)  NKDA Patient of Dr. Issa 35 y/o  @39 weeks presents to triage with complaints of decreased fetal movement since 0800pm. Pt denies complains of irregular contractions. Pt denies leaking of fluid and vaginal bleeding. Pt is scheduled for induction of labor of midnight. Pt denies fever, chills, nausea and vomiting  Current Ap course significant for: GDMA1  Medical H/x: Alpha Thalassemia   -Anemia  Surgical H/x: Tonsillectomy   Ob/Gyn H/x: 2017//Full-Term/7Lbs 6oz/GDMA 2/Hypothyrodism / Post-Partum Depression seen by therapist  x 6 months   Psych H/x: Denies   Meds: Levemir 15 units QHS  Humalog -25-30 Units TID based on Carbohydrate Intake   NKDA

## 2020-04-08 NOTE — OB PROVIDER TRIAGE NOTE - NS_VISIT REASON_OBGYN_ALL_OB_FT
Scheduled Induction return to ED if symptoms worsen, persist or questions arise/need for outpatient follow-up/radiology results

## 2020-04-08 NOTE — OB PROVIDER TRIAGE NOTE - PMH
Hypothyroidism  last pregnancy no medications at present or post partum as per the pateint  Vaginal delivery  8/28/ 2017 FT M 7#6

## 2020-04-09 DIAGNOSIS — O24.419 GESTATIONAL DIABETES MELLITUS IN PREGNANCY, UNSPECIFIED CONTROL: ICD-10-CM

## 2020-04-09 LAB
BASOPHILS # BLD AUTO: 0.02 K/UL — SIGNIFICANT CHANGE UP (ref 0–0.2)
BASOPHILS NFR BLD AUTO: 0.2 % — SIGNIFICANT CHANGE UP (ref 0–2)
BLD GP AB SCN SERPL QL: NEGATIVE — SIGNIFICANT CHANGE UP
EOSINOPHIL # BLD AUTO: 0.04 K/UL — SIGNIFICANT CHANGE UP (ref 0–0.5)
EOSINOPHIL NFR BLD AUTO: 0.4 % — SIGNIFICANT CHANGE UP (ref 0–6)
GLUCOSE BLDC GLUCOMTR-MCNC: 130 MG/DL — HIGH (ref 70–99)
GLUCOSE BLDC GLUCOMTR-MCNC: 73 MG/DL — SIGNIFICANT CHANGE UP (ref 70–99)
GLUCOSE BLDC GLUCOMTR-MCNC: 76 MG/DL — SIGNIFICANT CHANGE UP (ref 70–99)
GLUCOSE BLDC GLUCOMTR-MCNC: 88 MG/DL — SIGNIFICANT CHANGE UP (ref 70–99)
GLUCOSE BLDC GLUCOMTR-MCNC: 88 MG/DL — SIGNIFICANT CHANGE UP (ref 70–99)
GLUCOSE BLDC GLUCOMTR-MCNC: 95 MG/DL — SIGNIFICANT CHANGE UP (ref 70–99)
HCT VFR BLD CALC: 32 % — LOW (ref 34.5–45)
HGB BLD-MCNC: 10.4 G/DL — LOW (ref 11.5–15.5)
IMM GRANULOCYTES NFR BLD AUTO: 0.8 % — SIGNIFICANT CHANGE UP (ref 0–1.5)
LYMPHOCYTES # BLD AUTO: 2.59 K/UL — SIGNIFICANT CHANGE UP (ref 1–3.3)
LYMPHOCYTES # BLD AUTO: 28.1 % — SIGNIFICANT CHANGE UP (ref 13–44)
MCHC RBC-ENTMCNC: 23.8 PG — LOW (ref 27–34)
MCHC RBC-ENTMCNC: 32.5 % — SIGNIFICANT CHANGE UP (ref 32–36)
MCV RBC AUTO: 73.2 FL — LOW (ref 80–100)
MONOCYTES # BLD AUTO: 0.92 K/UL — HIGH (ref 0–0.9)
MONOCYTES NFR BLD AUTO: 10 % — SIGNIFICANT CHANGE UP (ref 2–14)
NEUTROPHILS # BLD AUTO: 5.59 K/UL — SIGNIFICANT CHANGE UP (ref 1.8–7.4)
NEUTROPHILS NFR BLD AUTO: 60.5 % — SIGNIFICANT CHANGE UP (ref 43–77)
NRBC # FLD: 0 K/UL — SIGNIFICANT CHANGE UP (ref 0–0)
PLATELET # BLD AUTO: 285 K/UL — SIGNIFICANT CHANGE UP (ref 150–400)
PMV BLD: 11 FL — SIGNIFICANT CHANGE UP (ref 7–13)
RBC # BLD: 4.37 M/UL — SIGNIFICANT CHANGE UP (ref 3.8–5.2)
RBC # FLD: 14.6 % — HIGH (ref 10.3–14.5)
RH IG SCN BLD-IMP: POSITIVE — SIGNIFICANT CHANGE UP
T PALLIDUM AB TITR SER: NEGATIVE — SIGNIFICANT CHANGE UP
WBC # BLD: 9.23 K/UL — SIGNIFICANT CHANGE UP (ref 3.8–10.5)
WBC # FLD AUTO: 9.23 K/UL — SIGNIFICANT CHANGE UP (ref 3.8–10.5)

## 2020-04-09 RX ORDER — PRAMOXINE HYDROCHLORIDE 150 MG/15G
1 AEROSOL, FOAM RECTAL EVERY 4 HOURS
Refills: 0 | Status: DISCONTINUED | OUTPATIENT
Start: 2020-04-09 | End: 2020-04-10

## 2020-04-09 RX ORDER — SIMETHICONE 80 MG/1
80 TABLET, CHEWABLE ORAL EVERY 4 HOURS
Refills: 0 | Status: DISCONTINUED | OUTPATIENT
Start: 2020-04-09 | End: 2020-04-10

## 2020-04-09 RX ORDER — DIBUCAINE 1 %
1 OINTMENT (GRAM) RECTAL EVERY 6 HOURS
Refills: 0 | Status: DISCONTINUED | OUTPATIENT
Start: 2020-04-09 | End: 2020-04-10

## 2020-04-09 RX ORDER — KETOROLAC TROMETHAMINE 30 MG/ML
30 SYRINGE (ML) INJECTION ONCE
Refills: 0 | Status: DISCONTINUED | OUTPATIENT
Start: 2020-04-09 | End: 2020-04-09

## 2020-04-09 RX ORDER — LANOLIN
1 OINTMENT (GRAM) TOPICAL EVERY 6 HOURS
Refills: 0 | Status: DISCONTINUED | OUTPATIENT
Start: 2020-04-09 | End: 2020-04-10

## 2020-04-09 RX ORDER — HYDROCORTISONE 1 %
1 OINTMENT (GRAM) TOPICAL EVERY 6 HOURS
Refills: 0 | Status: DISCONTINUED | OUTPATIENT
Start: 2020-04-09 | End: 2020-04-10

## 2020-04-09 RX ORDER — OXYCODONE HYDROCHLORIDE 5 MG/1
5 TABLET ORAL ONCE
Refills: 0 | Status: DISCONTINUED | OUTPATIENT
Start: 2020-04-09 | End: 2020-04-10

## 2020-04-09 RX ORDER — BENZOCAINE 10 %
1 GEL (GRAM) MUCOUS MEMBRANE EVERY 6 HOURS
Refills: 0 | Status: DISCONTINUED | OUTPATIENT
Start: 2020-04-09 | End: 2020-04-10

## 2020-04-09 RX ORDER — ACETAMINOPHEN 500 MG
975 TABLET ORAL
Refills: 0 | Status: DISCONTINUED | OUTPATIENT
Start: 2020-04-09 | End: 2020-04-10

## 2020-04-09 RX ORDER — TETANUS TOXOID, REDUCED DIPHTHERIA TOXOID AND ACELLULAR PERTUSSIS VACCINE, ADSORBED 5; 2.5; 8; 8; 2.5 [IU]/.5ML; [IU]/.5ML; UG/.5ML; UG/.5ML; UG/.5ML
0.5 SUSPENSION INTRAMUSCULAR ONCE
Refills: 0 | Status: DISCONTINUED | OUTPATIENT
Start: 2020-04-09 | End: 2020-04-10

## 2020-04-09 RX ORDER — IBUPROFEN 200 MG
600 TABLET ORAL EVERY 6 HOURS
Refills: 0 | Status: COMPLETED | OUTPATIENT
Start: 2020-04-09 | End: 2021-03-08

## 2020-04-09 RX ORDER — MAGNESIUM HYDROXIDE 400 MG/1
30 TABLET, CHEWABLE ORAL
Refills: 0 | Status: DISCONTINUED | OUTPATIENT
Start: 2020-04-09 | End: 2020-04-10

## 2020-04-09 RX ORDER — DIPHENHYDRAMINE HCL 50 MG
25 CAPSULE ORAL EVERY 6 HOURS
Refills: 0 | Status: DISCONTINUED | OUTPATIENT
Start: 2020-04-09 | End: 2020-04-10

## 2020-04-09 RX ORDER — OXYTOCIN 10 UNIT/ML
2 VIAL (ML) INJECTION
Qty: 30 | Refills: 0 | Status: DISCONTINUED | OUTPATIENT
Start: 2020-04-09 | End: 2020-04-09

## 2020-04-09 RX ORDER — GLYCERIN ADULT
1 SUPPOSITORY, RECTAL RECTAL AT BEDTIME
Refills: 0 | Status: DISCONTINUED | OUTPATIENT
Start: 2020-04-09 | End: 2020-04-10

## 2020-04-09 RX ORDER — OXYCODONE HYDROCHLORIDE 5 MG/1
5 TABLET ORAL
Refills: 0 | Status: DISCONTINUED | OUTPATIENT
Start: 2020-04-09 | End: 2020-04-10

## 2020-04-09 RX ORDER — SODIUM CHLORIDE 9 MG/ML
3 INJECTION INTRAMUSCULAR; INTRAVENOUS; SUBCUTANEOUS EVERY 8 HOURS
Refills: 0 | Status: DISCONTINUED | OUTPATIENT
Start: 2020-04-09 | End: 2020-04-10

## 2020-04-09 RX ORDER — OXYTOCIN 10 UNIT/ML
333.33 VIAL (ML) INJECTION
Qty: 20 | Refills: 0 | Status: DISCONTINUED | OUTPATIENT
Start: 2020-04-09 | End: 2020-04-09

## 2020-04-09 RX ORDER — AER TRAVELER 0.5 G/1
1 SOLUTION RECTAL; TOPICAL EVERY 4 HOURS
Refills: 0 | Status: DISCONTINUED | OUTPATIENT
Start: 2020-04-09 | End: 2020-04-10

## 2020-04-09 RX ADMIN — Medication 1000 MILLIUNIT(S)/MIN: at 19:44

## 2020-04-09 RX ADMIN — Medication 2 MILLIUNIT(S)/MIN: at 08:06

## 2020-04-09 RX ADMIN — Medication 30 MILLIGRAM(S): at 18:45

## 2020-04-09 RX ADMIN — Medication 975 MILLIGRAM(S): at 22:34

## 2020-04-09 RX ADMIN — SODIUM CHLORIDE 125 MILLILITER(S): 9 INJECTION, SOLUTION INTRAVENOUS at 08:08

## 2020-04-09 NOTE — OB RN DELIVERY SUMMARY - NS_SEPSISRSKCALC_OBGYN_ALL_OB_FT
EOS calculated successfully. EOS Risk Factor: 0.5/1000 live births ([facility] national incidence); GA=39w1d; Temp=98.8; ROM=7.583; GBS='Negative'; Antibiotics='No antibiotics or any antibiotics < 2 hrs prior to birth'

## 2020-04-09 NOTE — OB PROVIDER H&P - ASSESSMENT
Patient of Dr. Issa 33 y/o  @39 weeks presents to triage with complaints of decreased fetal movement since 0800pm. Pt denies complains of irregular contractions. Pt denies leaking of fluid and vaginal bleeding. Pt is scheduled for induction of labor of midnight. Pt denies fever, chills, nausea and vomiting  Current Ap course significant for: GDMA1  Medical H/x: Alpha Thalassemia   -Anemia  Surgical H/x: Tonsillectomy   Ob/Gyn H/x: 2017//Full-Term/7Lbs 6oz/GDMA 2/Hypothyrodism / Post-Partum Depression seen by therapist  x 6 months   Psych H/x: Denies   Meds: Levemir 15 units QHS  Humalog -25-30 Units TID based on Carbohydrate Intake   NKDA    VS: 104/71  Hr: 90  T: 36.9  Lungs: Clear Bilaterally  Heart: Regular rate and rhythm   Abdomen: Soft, non-tender on palpation  TAUS: Anterior placenta, Cephalic presentation, Bpp:8/8, JOSE: 13.44cm  SVE:4/60/-2  NST:  with moderate variability, 15x15 accelerations, no decelerations   [place]: Irregular contractions  GBS negative 2020  EFW: 3572  Covid 19 Negative 2020    Discussed with Dr. Lino:   -Admit to labor and delivery  -Routine orders placed  -PO Cytotec ordered for induction agent Patient of Dr. Issa 35 y/o  @39 weeks presents to triage with complaints of decreased fetal movement since 0800pm. Pt  complains of irregular contractions. Pt denies leaking of fluid and vaginal bleeding. Pt is scheduled for induction of labor of midnight. Pt denies fever, chills, nausea and vomiting  Current Ap course significant for: GDMA1  Medical H/x: Alpha Thalassemia   -Anemia  Surgical H/x: Tonsillectomy   Ob/Gyn H/x: 2017//Full-Term/7Lbs 6oz/GDMA 2/Hypothyrodism / Post-Partum Depression seen by therapist  x 6 months   Psych H/x: Denies   Meds: Levemir 15 units QHS  Humalog -25-30 Units TID based on Carbohydrate Intake   NKDA    VS: 104/71  Hr: 90  T: 36.9  Lungs: Clear Bilaterally  Heart: Regular rate and rhythm   Abdomen: Soft, non-tender on palpation  TAUS: Anterior placenta, Cephalic presentation, Bpp:8/8, JOSE: 13.44cm  SVE:4/60/-2  NST:  with moderate variability, 15x15 accelerations, no decelerations   [place]: Irregular contractions  GBS negative 2020  EFW: 3572  Covid 19 Negative 2020    Discussed with Dr. Lino:   -Admit to labor and delivery  -Routine orders placed  -PO Cytotec ordered for induction agent

## 2020-04-09 NOTE — OB PROVIDER H&P - NS_OBGYNHISTORY_OBGYN_ALL_OB_FT
2017//Full-Term/7Lbs 6oz/GDMA 2/Hypothyrodism / Post-Partum Depression seen by therapist  x 6 months

## 2020-04-09 NOTE — OB PROVIDER H&P - NSHPPHYSICALEXAM_GEN_ALL_CORE
VS: 104/71  Hr: 90  T: 36.9  Lungs: Clear Bilaterally  Heart: Regular rate and rhythm   Abdomen: Soft, non-tender on palpation  TAUS: Anterior placenta, Cephalic presentation, Bpp:8/8, JOSE: 13.44cm  SVE:4/60/-2  NST:  with moderate variability, 15x15 accelerations, no decelerations   [place]: Irregular contractions  GBS negative 03/18/2020  EFW: 3572  Covid 19 Negative 04/07/2020

## 2020-04-09 NOTE — OB PROVIDER H&P - HISTORY OF PRESENT ILLNESS
Patient of Dr. Issa 33 y/o  @39 weeks presents to triage with complaints of decreased fetal movement since 0800pm. Pt denies complains of irregular contractions. Pt denies leaking of fluid and vaginal bleeding. Pt is scheduled for induction of labor of midnight. Pt denies fever, chills, nausea and vomiting  Current Ap course significant for: GDMA1  Medical H/x: Alpha Thalassemia   -Anemia  Surgical H/x: Tonsillectomy   Ob/Gyn H/x: 2017//Full-Term/7Lbs 6oz/GDMA 2/Hypothyrodism / Post-Partum Depression seen by therapist  x 6 months   Psych H/x: Denies   Meds: Levemir 15 units QHS  Humalog -25-30 Units TID based on Carbohydrate Intake   NKDA Patient of Dr. Issa 33 y/o  @39 weeks presents to triage with complaints of decreased fetal movement since 0800pm. Pt complains of irregular contractions. Pt denies leaking of fluid and vaginal bleeding. Pt is scheduled for induction of labor of midnight. Pt denies fever, chills, nausea and vomiting  Current Ap course significant for: GDMA1  Medical H/x: Alpha Thalassemia   -Anemia  Surgical H/x: Tonsillectomy   Ob/Gyn H/x: 2017//Full-Term/7Lbs 6oz/GDMA 2/Hypothyrodism / Post-Partum Depression seen by therapist  x 6 months   Psych H/x: Denies   Meds: Levemir 15 units QHS  Humalog -25-30 Units TID based on Carbohydrate Intake   NKDA

## 2020-04-09 NOTE — CHART NOTE - NSCHARTNOTEFT_GEN_A_CORE
R3 Labor Note    Pt evaluated for increased pain. Pt requesting epidural    SVE: 4/60/-3  EFM: 120 bpm/mod esther/+accel/-decel  [place]: q2 min    Plan   -Cat 1 tracing  -c/w PO cytotec  -anesthesia consulted for epidural    CLARISA Bernstein PGY3

## 2020-04-10 ENCOUNTER — TRANSCRIPTION ENCOUNTER (OUTPATIENT)
Age: 35
End: 2020-04-10

## 2020-04-10 VITALS
HEART RATE: 95 BPM | DIASTOLIC BLOOD PRESSURE: 61 MMHG | RESPIRATION RATE: 17 BRPM | SYSTOLIC BLOOD PRESSURE: 104 MMHG | OXYGEN SATURATION: 100 % | TEMPERATURE: 98 F

## 2020-04-10 LAB
GLUCOSE BLDC GLUCOMTR-MCNC: 119 MG/DL — HIGH (ref 70–99)
GLUCOSE BLDC GLUCOMTR-MCNC: 92 MG/DL — SIGNIFICANT CHANGE UP (ref 70–99)
GLUCOSE BLDC GLUCOMTR-MCNC: 96 MG/DL — SIGNIFICANT CHANGE UP (ref 70–99)

## 2020-04-10 RX ORDER — INSULIN DETEMIR 100/ML (3)
0 INSULIN PEN (ML) SUBCUTANEOUS
Qty: 0 | Refills: 0 | DISCHARGE

## 2020-04-10 RX ORDER — ACETAMINOPHEN 500 MG
3 TABLET ORAL
Qty: 0 | Refills: 0 | DISCHARGE
Start: 2020-04-10

## 2020-04-10 RX ORDER — IBUPROFEN 200 MG
1 TABLET ORAL
Qty: 0 | Refills: 0 | DISCHARGE
Start: 2020-04-10

## 2020-04-10 RX ORDER — INSULIN LISPRO 100/ML
0 VIAL (ML) SUBCUTANEOUS
Qty: 0 | Refills: 0 | DISCHARGE

## 2020-04-10 RX ORDER — IBUPROFEN 200 MG
600 TABLET ORAL EVERY 6 HOURS
Refills: 0 | Status: DISCONTINUED | OUTPATIENT
Start: 2020-04-10 | End: 2020-04-10

## 2020-04-10 RX ADMIN — Medication 975 MILLIGRAM(S): at 11:42

## 2020-04-10 RX ADMIN — SODIUM CHLORIDE 3 MILLILITER(S): 9 INJECTION INTRAMUSCULAR; INTRAVENOUS; SUBCUTANEOUS at 05:32

## 2020-04-10 RX ADMIN — Medication 600 MILLIGRAM(S): at 17:41

## 2020-04-10 NOTE — DISCHARGE NOTE OB - YES, WALK AS TOLERATED
patient complaints of chest pain. Patient reports playing basketball last week and was having chest muscle sorness to the left side of chest to the back that has not resolved and got worse over the past week. Patient denies injury or  SOB. Statement Selected

## 2020-04-10 NOTE — DISCHARGE NOTE OB - MEDICATION SUMMARY - MEDICATIONS TO TAKE
I will START or STAY ON the medications listed below when I get home from the hospital:    acetaminophen 325 mg oral tablet  -- 3 tab(s) by mouth   -- Indication: For nsd    ibuprofen 600 mg oral tablet  -- 1 tab(s) by mouth every 6 hours  -- Indication: For nsd    Prenatal Multivitamins with Folic Acid 1 mg oral tablet  -- 1 tab(s) by mouth once a day  -- Indication: For nsd

## 2020-04-10 NOTE — DISCHARGE NOTE OB - CARE PROVIDER_API CALL
Cornelius Issa)  Obstetrics and Gynecology  [address] [place] [address]  Phone: [phone]  Fax: [phone]  Follow Up Time:

## 2020-04-10 NOTE — DISCHARGE NOTE OB - PATIENT PORTAL LINK FT
You can access the FollowMyHealth Patient Portal offered by [facility] by registering at the following website: [email or web address]. By joining [facility]’s FollowMyHealth portal, you will also be able to view your health information using other applications (apps) compatible with our system.

## 2020-04-10 NOTE — LACTATION INITIAL EVALUATION - INTERVENTION OUTCOME
demonstrates understanding of teaching/verbalizes understanding/Continue to follow and assist as needed./needs not met/good return demonstration

## 2020-04-10 NOTE — DISCHARGE NOTE OB - MEDICATION SUMMARY - MEDICATIONS TO STOP TAKING
I will STOP taking the medications listed below when I get home from the hospital:    Levemir  -- 15units qpm    HumaLOG  -- based on carbs  25-30 units

## 2020-04-10 NOTE — PROGRESS NOTE ADULT - SUBJECTIVE AND OBJECTIVE BOX
S: Patient doing well. Minimal lochia. Pain controlled.    O: Vital Signs Last 24 Hrs  T(C): 36.5 (10 Apr 2020 05:56), Max: 37.7 (2020 19:03)  T(F): 97.7 (10 Apr 2020 05:56), Max: 99.9 (2020 19:03)  HR: 87 (10 Apr 2020 05:56) (63 - 121)  BP: 107/67 (10 Apr 2020 05:56) (89/50 - 132/63)  BP(mean): --  RR: 17 (10 Apr 2020 05:56) (16 - 18)  SpO2: 98% (10 Apr 2020 05:56) (87% - 100%)    Gen: NAD  Abd: soft, NT, ND, fundus firm below umbilicus  Lochia: moderate  Ext: no tenderness    Labs:                        10.4   9.23  )-----------( 285      ( 2020 22:40 )             32.0   Opos    A: 34y PPD#1 s/p  doing well.    Plan:  continue care  discharge instructions given

## 2020-04-10 NOTE — PROGRESS NOTE ADULT - SUBJECTIVE AND OBJECTIVE BOX
SUBJECTIVE:    Pain: Controlled    Complaints: None    MILESTONES:     Alert and oriented x 3  [x  ]  Out of bed /ambulating [  x]    Voiding [x  ]  Due to void [  ]    Tolerating a regular diet.    Infant feeding:   Breast [  X]   Bottle [  ]     Both [  ]                         Feeding related issues or concerns:    Objective   T(C): 36.5 (04-10-20 @ 05:56), Max: 37.7 (20 @ 19:03)  HR: 87 (04-10-20 @ 05:56) (63 - 121)  BP: 107/67 (04-10-20 @ 05:56) (89/50 - 132/63)  RR: 17 (04-10-20 @ 05:56) (16 - 18)  SpO2: 98% (04-10-20 @ 05:56) (87% - 100%)  Wt(kg): --                        10.4   9.23  )-----------( 285      ( 2020 22:40 )             32.0         Blood Type: O Positive    RPR: Negative          MEDICATIONS  (STANDING):  acetaminophen   Tablet .. 975 milliGRAM(s) Oral <User Schedule>  diphtheria/tetanus/pertussis (acellular) Vaccine (ADAcel) 0.5 milliLiter(s) IntraMuscular once  ibuprofen  Tablet. 600 milliGRAM(s) Oral every 6 hours  prenatal multivitamin 1 Tablet(s) Oral daily  sodium chloride 0.9% lock flush 3 milliLiter(s) IV Push every 8 hours    MEDICATIONS  (PRN):  benzocaine 20%/menthol 0.5% Spray 1 Spray(s) Topical every 6 hours PRN for Perineal discomfort  dibucaine 1% Ointment 1 Application(s) Topical every 6 hours PRN Perineal discomfort  diphenhydrAMINE 25 milliGRAM(s) Oral every 6 hours PRN Pruritus  glycerin Suppository - Adult 1 Suppository(s) Rectal at bedtime PRN Constipation  hydrocortisone 1% Cream 1 Application(s) Topical every 6 hours PRN Moderate Pain (4-6)  lanolin Ointment 1 Application(s) Topical every 6 hours PRN nipple soreness  magnesium hydroxide Suspension 30 milliLiter(s) Oral two times a day PRN Constipation  oxyCODONE    IR 5 milliGRAM(s) Oral every 3 hours PRN Moderate to Severe Pain (4-10)  oxyCODONE    IR 5 milliGRAM(s) Oral once PRN Moderate to Severe Pain (4-10)  pramoxine 1%/zinc 5% Cream 1 Application(s) Topical every 4 hours PRN Moderate Pain (4-6)  simethicone 80 milliGRAM(s) Chew every 4 hours PRN Gas  witch hazel Pads 1 Application(s) Topical every 4 hours PRN Perineal discomfort      ASSESSMENT:      34y      G 2   P 2002       PP Day #   1    Delivery:   [ X ]             [   ]    Assisted delivery  :    Vacuum   [  ]   Forceps)  [  ]    Indication for assisted delivery: Tracing Abn [  ]     Maternal Exhaustion [  ]     Other [  ]    Past medical history significant for HPI:  Patient of Dr. Issa 33 y/o  @39 weeks presents to triage with complaints of decreased fetal movement since 0800pm. Pt complains of irregular contractions. Pt denies leaking of fluid and vaginal bleeding. Pt is scheduled for induction of labor of midnight. Pt denies fever, chills, nausea and vomiting  Current Ap course significant for: GDMA1  Medical H/x: Alpha Thalassemia   -Anemia  Surgical H/x: Tonsillectomy   Ob/Gyn H/x: 2017//Full-Term/7Lbs 6oz/GDMA 2/Hypothyrodism / Post-Partum Depression seen by therapist  x 6 months   Psych H/x: Denies   Meds: Levemir 15 units QHS  Humalog -25-30 Units TID based on Carbohydrate Intake   NKDA (2020 00:01)      Current Issues:     Breasts: Soft [x  ]    Engorged [  ]  Nipples:  Abdomen: Soft [ x ]  Nontender [  ]  Nondistended [  ]   Distended [  ]    Vagina: Lochia   Mod [x  ]      Scant [  ]  Heavy  [  ]    Perineum:  Intact [  ]   Laceration   [  ]   Type of laceration [          ]    Episiotomy [  ]    Type of episiotomy  [              ]    Lower extremities: Edema  [  ] noted bilaterally .  Nontender Ibrahima  [  ]  Negative Gillian's sign [  ] Positive pedal pulses [  ]    Other relevant physical exam findings;      PLAN:    Plan: Increase ambulation, analgesia PRN and pain medication  protocol standing oxycodone, ibuprofen and acetaminophen.    Diet: Continue regular diet    Labs:    Continue routine postpartum care.   SW Consult for a Hx. of Postpartum Depression.    Diabetes - For repeat glucose testing in 6 weeks.    Discharge Planning [x  ]    For  Discharge Today  [ X  ]    Consults :  Social Work [X  ]     Lactation [x  ]    Other [      ]

## 2020-04-10 NOTE — LACTATION INITIAL EVALUATION - LACTATION INTERVENTIONS
Assisted with deeper latch and more comfortable position.  Reviewed benefits of safe skin to skin and exclusive breastfeeding.  Reviewed pages 32-42 in the NEW BEGINNINGS book.  Encouraged to keep feeding log and to feed on cue.  Given list of community resources to assist with breastfeeding when she is discharged.  Instructed to see pedi within 48 hours of discharge.  Mother verbalized understanding/initiate skin to skin/initiate hand expression routine

## 2020-04-20 NOTE — OB PROVIDER DELIVERY SUMMARY - NSPROVIDERDELIVERYNOTE_OBGYN_ALL_OB_FT
Viable Male to NBN  pt stable to PP floor   No increased risk for VTE  PPH  or Sepsis at this time

## 2020-04-22 ENCOUNTER — TRANSCRIPTION ENCOUNTER (OUTPATIENT)
Age: 35
End: 2020-04-22

## 2021-10-04 NOTE — OB PROVIDER IHI INDUCTION/AUGMENTATION NOTE - NS_IHIMETHOD_OBGYN_ALL_OB
2021       Irlanda Sanchez MD  [address] [facility] [address]  Via In Basket      Patient: Loreto Madrid   YOB: 1971   Date of Visit: 10/4/2021       Dear Dr. Sanchez:    Thank you for referring Loreto Madrid to me for evaluation. Below are my notes for this visit with her.    If you have questions, please do not hesitate to call me. I look forward to following your patient along with you.      Sincerely,        Bull Stanton MD        CC: No Recipients  Bull Stanton MD  10/5/2021  4:45 PM  Signed  OFFICE VISIT      Patient: Loreto Madrid Date of Service: 10/4/2021    : 1971 MRN: [id number]     SUBJECTIVE:   HISTORY OF PRESENT ILLNESS:  Loreto Madrid is a 50 year old female who presents today for   Chief Complaint   Patient presents with   • Cardiac Clearance       Mrs Madrid is a 51 yo W here for follow up for HTN, HLD and obesity.      She says that she has intermittent chest pains which are varied in nature, lasting anywhere from second to minutes, inconsistently related to exertion (though she thinks she become winded more easily with climbing stairs) and is generally relieved by rest. There is no relation to eating or movement of her chest/arms. Denies palpitations, dizziness, nausea, syncope or pedal edema. A stress echo was negative in  and . A calcium score was negative in 2016. She states compliance with meds. An echo is normal. However, her ECG recently changed and she has STT changes. She has more stress at home. She had one episode of high BP about 2 weeks ago. Labs noted.  Since last visit, robby MPI was fine. After metoprolol no palpitations.   Weight remains high.   No new cv symptoms. Can walk 2 flights of stairs.  She has decided to get bariatric surgery: sleeve    PAST MEDICAL HISTORY:  Past Medical History:   Diagnosis Date   • Allergy to mold 2019   • Aneurysm of internal carotid artery 2011    Overview:   Stent placed 8/1/11-?coiling R opthalmic artery aneurysm  Last Assessment & Plan:  -   • Benign essential hypertension 7/27/2016   • Dyslipidemia 7/27/2016   • GERD (gastroesophageal reflux disease) 4/17/2018   • Metabolic syndrome    • Migraine with aura    • Mild persistent asthma, uncomplicated 2/19/2019   • Seasonal allergies 8/28/2019   • Subclinical hypothyroidism 1/12/2015   • Type 2 diabetes mellitus without complication, without long-term current use of insulin (CMS/[facility]) 5/6/2019   • Unilateral vestibular schwannoma (CMS/[facility]) 1/11/2017       MEDICATIONS:  Current Outpatient Medications   Medication Sig   • metoPROLOL succinate (TOPROL-XL) 25 MG 24 hr tablet TAKE 1 TABLET BY MOUTH DAILY   • rosuvastatin (CRESTOR) 40 MG tablet Take 1 tablet by mouth daily.   • losartan (COZAAR) 100 MG tablet Take 1 tablet by mouth daily.   • amLODIPine (NORVASC) 5 MG tablet Take 1 tablet by mouth daily.   • chlorthalidone (THALITONE) 25 MG tablet Take 1 tablet by mouth daily.   • potassium chloride (KLOR-CON M) 10 MEQ mike ER tablet Take 4 tablets by mouth daily.   • albuterol (PROAIR RESPICLICK) 108 (90 Base) MCG/ACT inhaler Inhale 2 puffs into the lungs every 4 hours as needed for Shortness of Breath or Wheezing.   • montelukast (SINGULAIR) 10 MG tablet Take 1 tablet by mouth daily.   • levalbuterol (XOPENEX) 1.25 MG/3ML nebulizer solution Take one neb bid prn   • triamcinolone (KENALOG) 0.5 % cream APPLY TOPICALLY TWO TIMES DAILY. USE NO LONGER THAN 2 WEEKS AT A TIME.   • nystatin (MYCOSTATIN) 631125 UNIT/GM ointment APPLY EXTERNALLY TO THE AFFECTED AREA TWICE DAILY FOR 7 DAYS   • Semaglutide, 1 MG/DOSE, (Ozempic, 1 MG/DOSE,) 4 MG/3ML Solution Pen-injector Inject 1 mg into the skin 1 day a week. Do not start before August 30, 2021.   • Ozempic, 0.25 or 0.5 MG/DOSE, 2 MG/1.5ML injection Inject 0.25 mg into the skin 1 day a week for 28 days, THEN 0.5 mg 1 day a week.   • metFORMIN (GLUCOPHAGE) 500 MG tablet TAKE 1 TABLET BY  MOUTH TWICE DAILY WITH MEALS   • cyclobenzaprine (FLEXERIL) 5 MG tablet Take 1 tablet by mouth 3 times daily as needed for Muscle spasms.   • fluticasone-vilanterol (BREO ELLIPTA) 200-25 MCG/INH inhaler Inhale 1 puff into the lungs daily.     No current facility-administered medications for this visit.       ALLERGIES:  ALLERGIES:   Allergen Reactions   • Morphine ANAPHYLAXIS     Unknown     • Penicillins HIVES and SWELLING     Unknown     • Tramadol VOMITING     \" i felt bad, i threw up, it made me feel dizzy\"   • Adhesive   (Environmental) PRURITUS   • Codeine DIZZINESS     Unknown     • Fentanyl RASH and PRURITUS     Itching    • Gadolinium Derivatives Other (See Comments)     Itching    • Hydrocodone-Acetaminophen VOMITING and PRURITUS     Rash, itching, faint      • Iodinated Diagnostic Agents HIVES and RASH   • Lisinopril Cough     Cough    • Latex HIVES and RASH       PAST SURGICAL HISTORY:  Past Surgical History:   Procedure Laterality Date   • Carotid angiogram     •  section, classic     • Ir cerebral coiling Right     ICA   • Tubal ligation         FAMILY HISTORY:  Family History   Problem Relation Age of Onset   • Coronary Artery Disease Mother    • Stroke Mother    • Heart disease Mother    • Hyperlipidemia Mother    • Diabetes Maternal Grandfather    • Heart disease Maternal Grandfather    • Myocardial Infarction Maternal Grandfather    • Hyperlipidemia Maternal Grandfather    • Patient is unaware of any medical problems Father    • Cardiomyopathy Brother    • Asthma Brother    • Heart disease Brother    • Hyperlipidemia Brother    • Depression Daughter    • Asthma Son    • Heart disease Maternal Grandmother    • Congestive Heart Failure Maternal Grandmother    • Hyperlipidemia Maternal Grandmother    • Heart disease Daughter        SOCIAL HISTORY:  Social History     Tobacco Use   • Smoking status: Never Smoker   • Smokeless tobacco: Never Used   Vaping Use   • Vaping Use: never used    Substance Use Topics   • Alcohol use: Never   • Drug use: Never       ROS     All other systems reviewed and are negative.    OBJECTIVE:     Physical Exam    Constitutional: oriented to person, place, and time. appears well-developed and well-nourished. No distress.   HENT:   Head: Normocephalic and atraumatic.   Mouth/Throat: Oropharynx is clear and moist.   Eyes: Conjunctivae and EOM are normal. Pupils are equal, round, and reactive to light.   Neck: Normal range of motion. Neck supple. No JVD present. No thyromegaly present.   Cardiovascular: Normal rate, regular rhythm, normal heart sounds and intact distal pulses.   Pulmonary/Chest: Effort normal and breath sounds normal. no wheezes. no rales.   Abdominal: Soft. Bowel sounds are normal. There is no tenderness. There is no rebound.   Musculoskeletal: Normal range of motion.   Lymphadenopathy:        Right cervical: No superficial cervical adenopathy present.       Left cervical: No superficial cervical adenopathy present.   Neurological: alert and oriented to person, place, and time. normal reflexes.   Skin: Skin is warm, dry and intact.   Psychiatric: normal mood and affect. behavior is normal. Thought content normal.       Visit Vitals  LMP 08/02/2021 (Approximate)     There is no height or weight on file to calculate BMI.  Wt Readings from Last 4 Encounters:   08/23/21 98.2 kg (216 lb 9.6 oz)   08/02/21 98.5 kg (217 lb 0.7 oz)   06/28/21 99.3 kg (219 lb 0.1 oz)   06/28/21 98.9 kg (218 lb)       Vital Signs and previous readings were reviewed during today's visit    DIAGNOSTIC STUDIES:   LAB RESULTS:  Recent Labs   Lab 08/27/21  0935 03/01/21  1206   Cholesterol 138 161   HDL 63 70   Triglycerides 108 171*   CALCLDL 53 57   Non-HDL Cholesterol 75 91       Recent Labs   Lab 08/27/21  0935 06/28/21  1623 03/01/21  1206   Sodium 141 139 140   Chloride 102 102 102   BUN 16 14 13   BUN/ Creatinine Ratio 24 33* 19   Potassium 3.8 3.5 3.5   Glucose 120* 212* 102*    Creatinine 0.68 0.42* 0.68   Calcium 9.1 9.5 9.8   TSH 2.062  --  2.278       Recent Labs   Lab 08/27/21  0935 03/01/21  1206   WBC 8.6 9.1   RBC 4.96 5.02   HGB 15.0 15.3   HCT 41.7 43.1   MCV 84.1 85.9   MCHC 36.0 35.5   RDW-CV 12.7 12.5    293   Lymphocytes, Percent 34 38       Recent Labs   Lab 08/27/21  0935 03/01/21  1206   Bilirubin, Direct 0.2  --    GPT 26  27 22        No results for input(s): NTPROB in the last 8765 hours.    2013: CODI: 6:36, 80% MPHR, normal  2016: CACS=0  2016: ECG: NSR.   2016: Echo: normal.   2017: CODI: negative at 7:13 Rishi  2020: ECG: STach, STT changes  2020: fabi MPI: normal     Lab Results Reviewed    The 10-year ASCVD risk score (Flex DC Jr., et al., 2013) is: 1.2%    Values used to calculate the score:      Age: 50 years      Sex: Female      Is Non- : No      Diabetic: Yes      Tobacco smoker: No      Systolic Blood Pressure: 104 mmHg      Is BP treated: Yes      HDL Cholesterol: 63 mg/dL      Total Cholesterol: 138 mg/dL    Hemoglobin A1C (%)   Date Value   08/27/2021 6.2 (H)   .         Patient's chart was reviewed for previous cardiology lab work and testing. Pertinent findings were reviewed with the patient during today's visit.    ASSESSMENT AND PLAN:   This is a 50 year old year-old female who presents with:  1. Atypical chest pain (R07.89)   · unable to do cor CTA due to high HR. Fabi MPI stress test was fine. Continue with BB for now.   2. Dyslipidemia (E78.5)   · Needs LDL < 70 goal. Continue aspirin and rosuvastatin to 40mg daily. Consider adding ezetimibe. followup lipids before next visit.   3. Benign essential hypertension (I10)   · well controlled. Continue amlodipine, chlorthalidone and losartan. Plant based and low salt diet and exercise explained. Weight loss advised.     4. Aneurysm of carotid artery (I72.0)   · sp angiogram 2017. followup with neuro sx   5. Obesity, Class II, BMI 35.0-39.9, with comorbidity (see actual BMI)  Pitocin (E66.9)   · Explained to the patient in detail about the benefit of plant based and low salt diet and regular aerobic exercising in weight loss. Patient is agreement with the plan.    AS unable to lose weight, consider bariatric surgery.    6. Type 2 diabetes mellitus, uncontrolled (E11.65)   · per PCP. Consider Ozempic if covered by insurance as it is associated with weight loss. She did not toleate it.    7. Preop cv evaluation  She is at low cv risk for general anesthesia and bariatric surgery. May proceed without any further preop cv testing.    Instructions provided as documented in the AVS.    The patient indicated understanding of the diagnosis and agreed with the plan of care.      Greater than 50% of the visit was spent counseling regarding above issues; total time spent 20 minutes.

## 2021-10-18 NOTE — OB RN TRIAGE NOTE - NS_GESTAGE_OBGYN_ALL_OB_FT
39w PAST SURGICAL HISTORY:  H/O colonoscopy 2021    H/O cone biopsy of cervix     H/O dilation and curettage     History of esophagogastroduodenoscopy (EGD)     History of foot surgery

## 2022-02-17 NOTE — DISCHARGE NOTE OB - NSCORESITESY/N_GEN_A_CORE_RD
Patient needs documents filled out for [facility], she states that she will fax it today, already paid $25 fee over the phone. Patient states she needs this done as soon as possible. Thanks.     Patient can be reached at [phone] No

## 2022-03-24 ENCOUNTER — RESULT REVIEW (OUTPATIENT)
Age: 37
End: 2022-03-24

## 2022-06-28 NOTE — CHART NOTE - NSCHARTNOTEFT_GEN_A_CORE
Cat 1 tracing  CTX q 3-4 min  on Pit 6mu/min   VE 4/90/-2  AROM  clear   EFW  8 lb   expect  Per review of chart, patient is now scheduled on 7/7/22 with DGD.    Postponing to verify that patient is seen as scheduled

## 2023-02-21 PROBLEM — E03.9 HYPOTHYROIDISM, UNSPECIFIED: Chronic | Status: ACTIVE | Noted: 2020-04-06

## 2023-03-14 ENCOUNTER — APPOINTMENT (OUTPATIENT)
Dept: PULMONOLOGY | Facility: CLINIC | Age: 38
End: 2023-03-14

## 2023-07-14 ENCOUNTER — EMERGENCY (EMERGENCY)
Facility: HOSPITAL | Age: 38
LOS: 0 days | Discharge: HOME | End: 2023-07-14
Attending: STUDENT IN AN ORGANIZED HEALTH CARE EDUCATION/TRAINING PROGRAM
Payer: COMMERCIAL

## 2023-07-14 VITALS
SYSTOLIC BLOOD PRESSURE: 110 MMHG | DIASTOLIC BLOOD PRESSURE: 83 MMHG | RESPIRATION RATE: 16 BRPM | TEMPERATURE: 98 F | HEART RATE: 64 BPM | OXYGEN SATURATION: 99 %

## 2023-07-14 VITALS
WEIGHT: 98.11 LBS | RESPIRATION RATE: 17 BRPM | HEART RATE: 75 BPM | SYSTOLIC BLOOD PRESSURE: 104 MMHG | HEIGHT: 61 IN | OXYGEN SATURATION: 98 % | TEMPERATURE: 98 F | DIASTOLIC BLOOD PRESSURE: 67 MMHG

## 2023-07-14 DIAGNOSIS — R53.83 OTHER FATIGUE: ICD-10-CM

## 2023-07-14 DIAGNOSIS — Z90.89 ACQUIRED ABSENCE OF OTHER ORGANS: Chronic | ICD-10-CM

## 2023-07-14 DIAGNOSIS — Z86.32 PERSONAL HISTORY OF GESTATIONAL DIABETES: ICD-10-CM

## 2023-07-14 DIAGNOSIS — R79.89 OTHER SPECIFIED ABNORMAL FINDINGS OF BLOOD CHEMISTRY: ICD-10-CM

## 2023-07-14 LAB
ALBUMIN SERPL ELPH-MCNC: 4.1 G/DL — SIGNIFICANT CHANGE UP (ref 3.3–5)
ALP SERPL-CCNC: 83 U/L — SIGNIFICANT CHANGE UP (ref 40–120)
ALT FLD-CCNC: 138 U/L — HIGH (ref 12–78)
ANION GAP SERPL CALC-SCNC: 3 MMOL/L — LOW (ref 5–17)
ANISOCYTOSIS BLD QL: SLIGHT — SIGNIFICANT CHANGE UP
APAP SERPL-MCNC: < 2 UG/ML (ref 10–30)
AST SERPL-CCNC: 58 U/L — HIGH (ref 15–37)
BASOPHILS # BLD AUTO: 0.04 K/UL — SIGNIFICANT CHANGE UP (ref 0–0.2)
BASOPHILS NFR BLD AUTO: 0.3 % — SIGNIFICANT CHANGE UP (ref 0–2)
BILIRUB SERPL-MCNC: 0.3 MG/DL — SIGNIFICANT CHANGE UP (ref 0.2–1.2)
BUN SERPL-MCNC: 16 MG/DL — SIGNIFICANT CHANGE UP (ref 7–23)
CALCIUM SERPL-MCNC: 9.5 MG/DL — SIGNIFICANT CHANGE UP (ref 8.5–10.1)
CHLORIDE SERPL-SCNC: 106 MMOL/L — SIGNIFICANT CHANGE UP (ref 96–108)
CO2 SERPL-SCNC: 28 MMOL/L — SIGNIFICANT CHANGE UP (ref 22–31)
CREAT SERPL-MCNC: 0.48 MG/DL — LOW (ref 0.5–1.3)
DACRYOCYTES BLD QL SMEAR: SLIGHT — SIGNIFICANT CHANGE UP
EGFR: 124 ML/MIN/1.73M2 — SIGNIFICANT CHANGE UP
ELLIPTOCYTES BLD QL SMEAR: SLIGHT — SIGNIFICANT CHANGE UP
EOSINOPHIL # BLD AUTO: 0.18 K/UL — SIGNIFICANT CHANGE UP (ref 0–0.5)
EOSINOPHIL NFR BLD AUTO: 1.5 % — SIGNIFICANT CHANGE UP (ref 0–6)
GLUCOSE SERPL-MCNC: 91 MG/DL — SIGNIFICANT CHANGE UP (ref 70–99)
HCG SERPL-ACNC: <1 MIU/ML — SIGNIFICANT CHANGE UP
HCT VFR BLD CALC: 33 % — LOW (ref 34.5–45)
HGB BLD-MCNC: 10.8 G/DL — LOW (ref 11.5–15.5)
HYPOCHROMIA BLD QL: SLIGHT — SIGNIFICANT CHANGE UP
IMM GRANULOCYTES NFR BLD AUTO: 0.5 % — SIGNIFICANT CHANGE UP (ref 0–0.9)
LYMPHOCYTES # BLD AUTO: 28.5 % — SIGNIFICANT CHANGE UP (ref 13–44)
LYMPHOCYTES # BLD AUTO: 3.43 K/UL — HIGH (ref 1–3.3)
MACROCYTES BLD QL: SLIGHT — SIGNIFICANT CHANGE UP
MANUAL SMEAR VERIFICATION: SIGNIFICANT CHANGE UP
MCHC RBC-ENTMCNC: 23.4 PG — LOW (ref 27–34)
MCHC RBC-ENTMCNC: 32.7 G/DL — SIGNIFICANT CHANGE UP (ref 32–36)
MCV RBC AUTO: 71.4 FL — LOW (ref 80–100)
MICROCYTES BLD QL: SLIGHT — SIGNIFICANT CHANGE UP
MONOCYTES # BLD AUTO: 0.52 K/UL — SIGNIFICANT CHANGE UP (ref 0–0.9)
MONOCYTES NFR BLD AUTO: 4.3 % — SIGNIFICANT CHANGE UP (ref 2–14)
NEUTROPHILS # BLD AUTO: 7.82 K/UL — HIGH (ref 1.8–7.4)
NEUTROPHILS NFR BLD AUTO: 64.9 % — SIGNIFICANT CHANGE UP (ref 43–77)
NRBC # BLD: 0 /100 WBCS — SIGNIFICANT CHANGE UP (ref 0–0)
OVALOCYTES BLD QL SMEAR: SIGNIFICANT CHANGE UP
PLAT MORPH BLD: NORMAL — SIGNIFICANT CHANGE UP
PLATELET # BLD AUTO: 398 K/UL — SIGNIFICANT CHANGE UP (ref 150–400)
POTASSIUM SERPL-MCNC: 4.2 MMOL/L — SIGNIFICANT CHANGE UP (ref 3.5–5.3)
POTASSIUM SERPL-SCNC: 4.2 MMOL/L — SIGNIFICANT CHANGE UP (ref 3.5–5.3)
PROT SERPL-MCNC: 7.9 GM/DL — SIGNIFICANT CHANGE UP (ref 6–8.3)
RBC # BLD: 4.62 M/UL — SIGNIFICANT CHANGE UP (ref 3.8–5.2)
RBC # FLD: 14.4 % — SIGNIFICANT CHANGE UP (ref 10.3–14.5)
RBC BLD AUTO: ABNORMAL
SODIUM SERPL-SCNC: 137 MMOL/L — SIGNIFICANT CHANGE UP (ref 135–145)
WBC # BLD: 12.05 K/UL — HIGH (ref 3.8–10.5)
WBC # FLD AUTO: 12.05 K/UL — HIGH (ref 3.8–10.5)

## 2023-07-14 PROCEDURE — 99284 EMERGENCY DEPT VISIT MOD MDM: CPT

## 2023-07-14 RX ORDER — SODIUM CHLORIDE 9 MG/ML
1000 INJECTION INTRAMUSCULAR; INTRAVENOUS; SUBCUTANEOUS ONCE
Refills: 0 | Status: COMPLETED | OUTPATIENT
Start: 2023-07-14 | End: 2023-07-14

## 2023-07-14 RX ADMIN — SODIUM CHLORIDE 1000 MILLILITER(S): 9 INJECTION INTRAMUSCULAR; INTRAVENOUS; SUBCUTANEOUS at 20:25

## 2023-07-14 NOTE — ED PROVIDER NOTE - PATIENT PORTAL LINK FT
You can access the FollowMyHealth Patient Portal offered by [facility] by registering at the following website: [email or web address]. By joining [employer]’s FollowMyHealth portal, you will also be able to view your health information using other applications (apps) compatible with our system.

## 2023-07-14 NOTE — ED PROVIDER NOTE - NSFOLLOWUPINSTRUCTIONS_ED_ALL_ED_FT
You were seen today for elevated liver enzymes - your tylenol level was normal. Your liver enzymes remain elevated    Repeat the testing with your doctor in 3- days, return for any severe pain, weakness, nausea, vomiting    Stay hydrated    Follow up with gastroenterologist for further evaluation if liver tests remain elevated

## 2023-07-14 NOTE — ED ADULT TRIAGE NOTE - CHIEF COMPLAINT QUOTE
sent to ED by pmd for elevated Liver enzymes. pt states she "was sick last week and took a lot of Tylenol" . PMD concerned about liver toxicity. no symptom as triage.

## 2023-07-14 NOTE — ED ADULT NURSE NOTE - OBJECTIVE STATEMENT
Pt AAOx4, ambulatory with stable gait. 37 year old female with past medical history of gestational diabetes, hypothyroid while pregnant; presents to ED after being sent by PCP to get liver enzymes rechecked. Per patient, had her blood work taken yesterday and liver enzymes were elevated. Patient states she was sick last week and was taking amoxicillin and Tylenol. Per patient she was taking the extra strength Tylenol and estimates she took 4-6 pills per day (6533-5596 mg daily).  Patient is concerned of liver toxicity, however, is experiencing no symptoms at this time. Denies chest pain, shortness of breath, dyspnea, nausea, vomiting, diarrhea, constipation, dizziness, weakness, headache, fever, chills, abdominal pain, hematuria, dysuria, hematochezia, dyschezia. Respirations equal and unlabored, no acute distress noted at this time.

## 2023-07-14 NOTE — ED PROVIDER NOTE - NS ED ROS FT
Gen: No fever, normal appetite  Resp: No cough or trouble breathing  Cardiovascular: No chest pain or palpitation  Gastroenteric: No nausea/vomiting, or abd pain   :  No change in urine output; no dysuria  MS: No joint or muscle pain  Skin: No rashes  Neuro: No headache; no abnormal movements  Remainder negative, except as per the HPI

## 2023-07-14 NOTE — ED PROVIDER NOTE - PHYSICAL EXAMINATION
Gen: AOX3, NAD  Head: NCAT  ENT: Airway patent, moist mucous membranes, nasal passageways clear  Cardiac: Normal rate, rhythm  Respiratory: normal respirations and normal work of breathing , lungs cta b/l.   Gastrointestinal: Abdomen nondistended, central adiposity absent, nontender  MSK: No gross abnormalities, FROM of all four extremities  Skin: No rashes, no lesions  Neuro: No gross neurologic deficits, normal speech, no gait abnormality

## 2023-07-14 NOTE — ED PROVIDER NOTE - HIV OFFER
Quality 111:Pneumonia Vaccination Status For Older Adults: Pneumococcal Vaccination Previously Received Detail Level: Detailed Quality 226: Preventive Care And Screening: Tobacco Use: Screening And Cessation Intervention: Patient screened for tobacco use and is an ex/non-smoker Opt out Quality 47: Advance Care Plan: Advance Care Planning discussed and documented in the medical record; patient did not wish or was not able to name a surrogate decision maker or provide an advance care plan. Quality 110: Preventive Care And Screening: Influenza Immunization: Influenza Immunization previously received during influenza season

## 2023-07-14 NOTE — ED PROVIDER NOTE - CARE PROVIDER_API CALL
Gulshan Melendez  Gastroenterology  [address] [place] [address]  Phone: [phone]  Fax: [phone]  Follow Up Time: 7-10 Days

## 2023-07-14 NOTE — ED PROVIDER NOTE - CLINICAL SUMMARY MEDICAL DECISION MAKING FREE TEXT BOX
last tylenol 1 week ago 37F no pmhx except for gestational dm that resolved, who presents with concern for elevated LFTs noted yday on labwork taken by PMD- she reports AST 48/ . She reports generalized fatigue but denies any abd pain, nausea, vomiting, fevers, persistent URI symptoms, changes in urination, chest pain, or sob.  - no recent acetaminophen use . She reports recent upper respiratory infcn     pt is well appearing, no jaundice, will check labs to evaluation for any signs of biliary obstruction on lab work, metabolic derangements, anemia, and to eval for any pregnancy or elevated acetaminophen level - pt does not appear to have toxicity from acetaminophen as last dose was 1 week ago and she has no active symptoms, LFT elevation may be reactive from recent antibiotics or infection. Will recheck LFTs and if not doubled or significant worse, will recommend outpt monitoring and GI follow up. Pt offered RUQ sono to eval for other causes of liver dysfcn such as fatty liver, but pt declined and would like to pursue outpt follow up if labs not significantly uptrending

## 2023-07-14 NOTE — ED ADULT NURSE NOTE - NSFALLUNIVINTERV_ED_ALL_ED
Bed/Stretcher in lowest position, wheels locked, appropriate side rails in place/Call bell, personal items and telephone in reach/Instruct patient to call for assistance before getting out of bed/chair/stretcher/Non-slip footwear applied when patient is off stretcher/[place] to call system/Physically safe environment - no spills, clutter or unnecessary equipment/Purposeful proactive rounding/Room/bathroom lighting operational, light cord in reach

## 2023-07-14 NOTE — ED PROVIDER NOTE - OBJECTIVE STATEMENT
37F no pmhx except for gestational dm that resolved, who presents with concern for elevated LFTs noted yday on labwork taken by PMD- she reports AST 48/. She states she saw PMD for evaluation due to persistent fatigue after an upper respiratory infection - states she was dx with strep and was taking amoxicillin and tylenol for pain. She denies any abd pain, nausea, vomiting, fevers, persistent URI symptoms, changes in urination, chest pain, or sob. She reports last tylenol was 1 week ago

## 2023-08-12 NOTE — PATIENT PROFILE OB - WEIGHT: TOTAL WEIGHT IN LBS
This was a shared visit with the MAINE. I reviewed and verified the documentation and independently performed the documented:
18

## 2023-09-29 ENCOUNTER — APPOINTMENT (OUTPATIENT)
Dept: HEPATOLOGY | Facility: CLINIC | Age: 38
End: 2023-09-29

## 2024-02-05 PROBLEM — Z78.9 OTHER SPECIFIED HEALTH STATUS: Chronic | Status: ACTIVE | Noted: 2023-07-16

## 2024-02-20 ENCOUNTER — APPOINTMENT (OUTPATIENT)
Dept: OBGYN | Facility: CLINIC | Age: 39
End: 2024-02-20
Payer: COMMERCIAL

## 2024-02-22 ENCOUNTER — APPOINTMENT (OUTPATIENT)
Dept: OBGYN | Facility: CLINIC | Age: 39
End: 2024-02-22
Payer: COMMERCIAL

## 2024-02-22 ENCOUNTER — APPOINTMENT (OUTPATIENT)
Dept: ANTEPARTUM | Facility: CLINIC | Age: 39
End: 2024-02-22

## 2024-02-22 ENCOUNTER — ASOB RESULT (OUTPATIENT)
Age: 39
End: 2024-02-22

## 2024-02-22 VITALS
HEIGHT: 62 IN | SYSTOLIC BLOOD PRESSURE: 130 MMHG | WEIGHT: 104 LBS | BODY MASS INDEX: 19.14 KG/M2 | DIASTOLIC BLOOD PRESSURE: 89 MMHG

## 2024-02-22 DIAGNOSIS — O20.0 THREATENED ABORTION: ICD-10-CM

## 2024-02-22 PROCEDURE — 99202 OFFICE O/P NEW SF 15 MIN: CPT

## 2024-02-22 PROCEDURE — 81025 URINE PREGNANCY TEST: CPT

## 2024-02-22 PROCEDURE — 76817 TRANSVAGINAL US OBSTETRIC: CPT

## 2024-02-22 NOTE — DISCUSSION/SUMMARY
[FreeTextEntry1] : Discussed tvus:  A single intrauterine gestation is seen with absent cardiac activity measuring 8 weeks by CRL. Probable yolk sac appears collapsed.  Findings c/w missed Ab. Discussed early preg loss management options. referred to family planning.  Reviewed VB warning s/s.   Patient verbalized understanding.

## 2024-02-22 NOTE — HISTORY OF PRESENT ILLNESS
[FreeTextEntry1] : 37 y/o  female, LMP: 2022, presents as new patient for pregnancy confirmation.   c/o vaginal spotting for last few week. as per HIE: Rh positive.     Medical History GYN HX:2 -gHTN PMH: pre-diabetes PSH: tonsils Meds:metformin Allergies: NKDA

## 2024-02-26 ENCOUNTER — APPOINTMENT (OUTPATIENT)
Dept: OBGYN | Facility: CLINIC | Age: 39
End: 2024-02-26
Payer: COMMERCIAL

## 2024-02-26 VITALS
HEART RATE: 77 BPM | TEMPERATURE: 97.6 F | DIASTOLIC BLOOD PRESSURE: 67 MMHG | OXYGEN SATURATION: 100 % | SYSTOLIC BLOOD PRESSURE: 108 MMHG

## 2024-02-26 DIAGNOSIS — O03.9 COMPLETE OR UNSPECIFIED SPONTANEOUS ABORTION W/OUT COMPLICATION: ICD-10-CM

## 2024-02-26 PROCEDURE — 99213 OFFICE O/P EST LOW 20 MIN: CPT | Mod: 25

## 2024-02-26 PROCEDURE — 76830 TRANSVAGINAL US NON-OB: CPT
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
## 2024-02-26 NOTE — PROCEDURE
[Transvaginal Ultrasound] : transvaginal ultrasound [Threatened Miscarriage] : threatened miscarriage [FreeTextEntry4] : Spontaneous  [FreeTextEntry3] : Thick homogenous endometrial echo

## 2024-02-26 NOTE — PHYSICAL EXAM
[Chaperone Present] : A chaperone was present in the examining room during all aspects of the physical examination [Appropriately responsive] : appropriately responsive [No Acute Distress] : no acute distress [Alert] : alert [Soft] : soft [Non-tender] : non-tender [Non-distended] : non-distended [Oriented x3] : oriented x3 [FreeTextEntry1] : NSEDA LPN

## 2024-02-29 LAB
HCG UR QL: POSITIVE
QUALITY CONTROL: YES

## 2024-05-06 ENCOUNTER — APPOINTMENT (OUTPATIENT)
Dept: ENDOCRINOLOGY | Facility: CLINIC | Age: 39
End: 2024-05-06

## 2025-07-24 NOTE — OB RN TRIAGE NOTE - NS_DCDISPOSITION_OBGYN_ALL_OB
Called pt and notified of below. Pt agreeable and denied any additional questions.     Yes, that would be amazing! She can stop ferrous sulfate. Thank you so much!  Alexandru  View older events    Me to Alexandru Lara MD  KW    7/24/25  8:49 AM  Result Note  Dr. Lara, Pt was in office 7/23/25 results shown. Check-out Note: Will notify her of iron panel and ferritin result. Can I let her know results look good and to follow up as scheduled? Eli   Home